# Patient Record
Sex: FEMALE | Race: WHITE | Employment: PART TIME | URBAN - METROPOLITAN AREA
[De-identification: names, ages, dates, MRNs, and addresses within clinical notes are randomized per-mention and may not be internally consistent; named-entity substitution may affect disease eponyms.]

---

## 2018-10-10 LAB
ABO/RH: NORMAL
ANTIBODY SCREEN: NEGATIVE
HCT VFR BLD CALC: 38.2 % (ref 36–46)
HEMOGLOBIN: 12.7 G/DL (ref 12–16)
HEPATITIS B SURFACE ANTIGEN INTERPRETATION: NEGATIVE
HEPATITIS C ANTIBODY: NEGATIVE
HIV-1 AND HIV-2 ANTIBODIES: NON REACTIVE
MCV RBC AUTO: 95 FL
RPR TITER: NON REACTIVE
RPR: NON REACTIVE
RUBELLA ANTIBODY IGG: NORMAL

## 2018-11-29 ENCOUNTER — ROUTINE PRENATAL (OUTPATIENT)
Dept: PERINATAL CARE | Age: 23
End: 2018-11-29
Payer: COMMERCIAL

## 2018-11-29 VITALS
SYSTOLIC BLOOD PRESSURE: 113 MMHG | HEIGHT: 62 IN | WEIGHT: 158.8 LBS | BODY MASS INDEX: 29.22 KG/M2 | HEART RATE: 104 BPM | DIASTOLIC BLOOD PRESSURE: 72 MMHG

## 2018-11-29 DIAGNOSIS — Z34.02 ENCOUNTER FOR SUPERVISION OF NORMAL FIRST PREGNANCY IN SECOND TRIMESTER: Primary | ICD-10-CM

## 2018-11-29 PROCEDURE — 76805 OB US >/= 14 WKS SNGL FETUS: CPT

## 2019-02-25 LAB — GROUP B STREP VAGINAL CULTURE: NEGATIVE

## 2019-03-23 ENCOUNTER — ANESTHESIA (OUTPATIENT)
Dept: LABOR AND DELIVERY | Age: 24
DRG: 540 | End: 2019-03-23
Payer: COMMERCIAL

## 2019-03-23 ENCOUNTER — ANESTHESIA EVENT (OUTPATIENT)
Dept: LABOR AND DELIVERY | Age: 24
DRG: 540 | End: 2019-03-23
Payer: COMMERCIAL

## 2019-03-23 ENCOUNTER — HOSPITAL ENCOUNTER (INPATIENT)
Age: 24
LOS: 3 days | Discharge: HOME | DRG: 540 | End: 2019-03-27
Attending: OBSTETRICS & GYNECOLOGY | Admitting: OBSTETRICS & GYNECOLOGY
Payer: COMMERCIAL

## 2019-03-23 DIAGNOSIS — G89.18 POST-OP PAIN: Primary | ICD-10-CM

## 2019-03-23 PROBLEM — Z3A.40 40 WEEKS GESTATION OF PREGNANCY: Status: ACTIVE | Noted: 2019-03-23

## 2019-03-23 LAB
AMPHETAMINE SCREEN, URINE: NORMAL
BARBITURATE SCREEN URINE: NORMAL
BENZODIAZEPINE SCREEN, URINE: NORMAL
BUPRENORPHINE URINE: NORMAL
CANNABINOID SCREEN URINE: NORMAL
COCAINE METABOLITE SCREEN URINE: NORMAL
HCT VFR BLD CALC: 34.6 % (ref 36–48)
HEMOGLOBIN: 11.5 G/DL (ref 12–16)
Lab: NORMAL
MCH RBC QN AUTO: 29.8 PG (ref 26–34)
MCHC RBC AUTO-ENTMCNC: 33.3 G/DL (ref 31–36)
MCV RBC AUTO: 89.5 FL (ref 80–100)
METHADONE SCREEN, URINE: NORMAL
OPIATE SCREEN URINE: NORMAL
OXYCODONE URINE: NORMAL
PDW BLD-RTO: 14.7 % (ref 12.4–15.4)
PH UA: 6
PHENCYCLIDINE SCREEN URINE: NORMAL
PLATELET # BLD: 368 K/UL (ref 135–450)
PMV BLD AUTO: 8.3 FL (ref 5–10.5)
PROPOXYPHENE SCREEN: NORMAL
RBC # BLD: 3.86 M/UL (ref 4–5.2)
SPECIMEN STATUS: NORMAL
TOTAL SYPHILLIS IGG/IGM: NORMAL
WBC # BLD: 11.3 K/UL (ref 4–11)

## 2019-03-23 PROCEDURE — 2580000003 HC RX 258: Performed by: OBSTETRICS & GYNECOLOGY

## 2019-03-23 PROCEDURE — 6360000002 HC RX W HCPCS: Performed by: OBSTETRICS & GYNECOLOGY

## 2019-03-23 PROCEDURE — 86850 RBC ANTIBODY SCREEN: CPT

## 2019-03-23 PROCEDURE — 86901 BLOOD TYPING SEROLOGIC RH(D): CPT

## 2019-03-23 PROCEDURE — 86900 BLOOD TYPING SEROLOGIC ABO: CPT

## 2019-03-23 PROCEDURE — 86780 TREPONEMA PALLIDUM: CPT

## 2019-03-23 PROCEDURE — 3E033VJ INTRODUCTION OF OTHER HORMONE INTO PERIPHERAL VEIN, PERCUTANEOUS APPROACH: ICD-10-PCS | Performed by: OBSTETRICS & GYNECOLOGY

## 2019-03-23 PROCEDURE — 85027 COMPLETE CBC AUTOMATED: CPT

## 2019-03-23 PROCEDURE — 6370000000 HC RX 637 (ALT 250 FOR IP): Performed by: OBSTETRICS & GYNECOLOGY

## 2019-03-23 PROCEDURE — 80307 DRUG TEST PRSMV CHEM ANLYZR: CPT

## 2019-03-23 PROCEDURE — 3700000025 EPIDURAL BLOCK: Performed by: ANESTHESIOLOGY

## 2019-03-23 PROCEDURE — 2500000003 HC RX 250 WO HCPCS: Performed by: NURSE ANESTHETIST, CERTIFIED REGISTERED

## 2019-03-23 RX ORDER — ONDANSETRON 2 MG/ML
4 INJECTION INTRAMUSCULAR; INTRAVENOUS EVERY 6 HOURS PRN
Status: DISCONTINUED | OUTPATIENT
Start: 2019-03-23 | End: 2019-03-24

## 2019-03-23 RX ORDER — BUPIVACAINE HYDROCHLORIDE 2.5 MG/ML
INJECTION, SOLUTION EPIDURAL; INFILTRATION; INTRACAUDAL PRN
Status: DISCONTINUED | OUTPATIENT
Start: 2019-03-23 | End: 2019-03-24 | Stop reason: SDUPTHER

## 2019-03-23 RX ORDER — DOCUSATE SODIUM 100 MG/1
100 CAPSULE, LIQUID FILLED ORAL 2 TIMES DAILY
Status: DISCONTINUED | OUTPATIENT
Start: 2019-03-23 | End: 2019-03-24

## 2019-03-23 RX ORDER — SODIUM CHLORIDE, SODIUM LACTATE, POTASSIUM CHLORIDE, CALCIUM CHLORIDE 600; 310; 30; 20 MG/100ML; MG/100ML; MG/100ML; MG/100ML
INJECTION, SOLUTION INTRAVENOUS CONTINUOUS
Status: DISCONTINUED | OUTPATIENT
Start: 2019-03-23 | End: 2019-03-24

## 2019-03-23 RX ORDER — LIDOCAINE HYDROCHLORIDE AND EPINEPHRINE 20; 5 MG/ML; UG/ML
INJECTION, SOLUTION EPIDURAL; INFILTRATION; INTRACAUDAL; PERINEURAL PRN
Status: DISCONTINUED | OUTPATIENT
Start: 2019-03-23 | End: 2019-03-24 | Stop reason: SDUPTHER

## 2019-03-23 RX ORDER — TERBUTALINE SULFATE 1 MG/ML
0.25 INJECTION, SOLUTION SUBCUTANEOUS ONCE
Status: DISCONTINUED | OUTPATIENT
Start: 2019-03-23 | End: 2019-03-24

## 2019-03-23 RX ADMIN — SODIUM CHLORIDE, POTASSIUM CHLORIDE, SODIUM LACTATE AND CALCIUM CHLORIDE: 600; 310; 30; 20 INJECTION, SOLUTION INTRAVENOUS at 16:23

## 2019-03-23 RX ADMIN — Medication 25 MCG: at 06:08

## 2019-03-23 RX ADMIN — Medication 25 MCG: at 01:59

## 2019-03-23 RX ADMIN — Medication 1 MILLI-UNITS/MIN: at 10:40

## 2019-03-23 RX ADMIN — SODIUM CHLORIDE, POTASSIUM CHLORIDE, SODIUM LACTATE AND CALCIUM CHLORIDE: 600; 310; 30; 20 INJECTION, SOLUTION INTRAVENOUS at 01:58

## 2019-03-23 RX ADMIN — SODIUM CHLORIDE, POTASSIUM CHLORIDE, SODIUM LACTATE AND CALCIUM CHLORIDE: 600; 310; 30; 20 INJECTION, SOLUTION INTRAVENOUS at 17:19

## 2019-03-23 RX ADMIN — ONDANSETRON 4 MG: 2 INJECTION INTRAMUSCULAR; INTRAVENOUS at 19:16

## 2019-03-23 RX ADMIN — BUPIVACAINE HYDROCHLORIDE 5 ML: 2.5 INJECTION, SOLUTION EPIDURAL; INFILTRATION; INTRACAUDAL; PERINEURAL at 17:30

## 2019-03-23 RX ADMIN — LIDOCAINE HYDROCHLORIDE AND EPINEPHRINE 3 ML: 20; 5 INJECTION, SOLUTION EPIDURAL; INFILTRATION; INTRACAUDAL; PERINEURAL at 17:27

## 2019-03-23 RX ADMIN — Medication 12 ML/HR: at 17:30

## 2019-03-23 RX ADMIN — SODIUM CHLORIDE, POTASSIUM CHLORIDE, SODIUM LACTATE AND CALCIUM CHLORIDE: 600; 310; 30; 20 INJECTION, SOLUTION INTRAVENOUS at 08:48

## 2019-03-23 NOTE — H&P
Obstetrics History and Physical        CHIEF COMPLAINT:  Induction of labor    HISTORY OF PRESENT ILLNESS:      The patient is a 26 y.o.  at 44w3d who has \had consistent prenatal care without complications.   Patient presents with a chief complaint as above and is being admitted for induction    DATES:    Last Menstrual Period:  18   Estimated Due Date:  Estimated Date of Delivery: 3/20/19     PRENATAL CARE:    Provider:  Елена Daniels MD     Blood Type/Rh:  A+  Antibody Screen:  neg  Rubella:  Equvicol  RPR:  neg  Hepatitis B Surface Antigen: neg  HIV:  neg  Gonorrhea:  neg  Chlamydia:  neg  MSAFP/Multiple Markers:  Date:  ; Results:  Declined testing  U/S Structural Survery:  Normal anatomy  1 hour Glucose Tolerance Test:  normal  Group B Strep:  neg  Trichomonas Screen:  neg  Cystic Fibrosis Screen:  Date:  ; Result:  Testing delcined  Hep C: neg    PAST OB HISTORY        Depression:  Yes, not currently on meds      Post-partum depression:  No      Diabetes:  No      Gestational Diabetes:  No      Thyroid Disease:  No      Chronic HTN:  No      Gestation HTN:  No      Pre-eclampsia:  No      Seizure disorder:  No      Asthma:  No      Clotting disorder:  No      :  No      Tubal ligation:  No      D & C:  No      Cerclage:  No      LEEP:  No      Myomectomy:  No    OB History    Para Term  AB Living   1             SAB TAB Ectopic Molar Multiple Live Births                    # Outcome Date GA Lbr Mj/2nd Weight Sex Delivery Anes PTL Lv   1 Current              Past Gynecological History:      Menarche:    Last menstrual period:  18  History of uterine fibroids:  No  History of endometriosis:  No  Pap History:  Patient has never had a Pap test.  Sexually transmitted disease history: none    Past Medical History:        Diagnosis Date    Depression     hospitalized in the past  no meds @ present    Depression with suicidal ideation 2016    [facility] EXAM:    General appearance:  awake, alert, cooperative, no apparent distress, and appears stated age  Neurologic:  Awake, alert, oriented to name, place and time. Cranial nerves II-XII are grossly intact. Heart:  regular rate and rhythm  Abdomen:  normal bowel sounds, soft, non-distended, non-tender; size appropriate for gestational age  Fetal heart rate:  Baseline Heart Rate 135, accelerations:  present  long term variability:  moderate  decelerations:  absent  Pelvis:    Cervix:  Exam by MD    DILATION:  4 cm  EFFACEMENT:   50%  STATION:  -2 cm  CONSISTENCY:  soft  POSITION:  mid  BISHOPS SCORE:      Contraction frequency:  1-3 minutes  Membranes:  AROM performed with moderate amount of clear fluid    ASSESSMENT AND PLAN:    .Van Rebolledo is a 26 y.o.  at 40w3d admitted for Induction of Labor since she is post dates.   -- s/p Cytotec x2 and currently on Pit of 5  --  Continue IOL with pitocin per protocol for adequate contractions, may insert IUPC as needed  -- epidural as desired  -- GBS negative  -- anticipate vaginal delivery    Clarita Lawrence MD

## 2019-03-23 NOTE — FLOWSHEET NOTE
Dr. Rich Barber called for and update on patient- notified her of last SVE with 2nd Cytotec placement, that patient has been sleeping through contractions, and of contraction pattern. Order received to place a 3rd Cytotec if patient is 1-2 cm with next vaginal exam, and to start Pitocin if patient is 3 cm or greater.

## 2019-03-24 VITALS
SYSTOLIC BLOOD PRESSURE: 121 MMHG | TEMPERATURE: 98.6 F | RESPIRATION RATE: 14 BRPM | DIASTOLIC BLOOD PRESSURE: 71 MMHG | OXYGEN SATURATION: 100 %

## 2019-03-24 PROBLEM — G89.18 POSTOPERATIVE PAIN: Status: ACTIVE | Noted: 2019-03-24

## 2019-03-24 LAB
ABO/RH: NORMAL
ANTIBODY SCREEN: NORMAL

## 2019-03-24 PROCEDURE — 6370000000 HC RX 637 (ALT 250 FOR IP): Performed by: OBSTETRICS & GYNECOLOGY

## 2019-03-24 PROCEDURE — 2500000003 HC RX 250 WO HCPCS: Performed by: NURSE ANESTHETIST, CERTIFIED REGISTERED

## 2019-03-24 PROCEDURE — 7100000001 HC PACU RECOVERY - ADDTL 15 MIN: Performed by: OBSTETRICS & GYNECOLOGY

## 2019-03-24 PROCEDURE — 2580000003 HC RX 258: Performed by: OBSTETRICS & GYNECOLOGY

## 2019-03-24 PROCEDURE — 6360000002 HC RX W HCPCS

## 2019-03-24 PROCEDURE — 6360000002 HC RX W HCPCS: Performed by: OBSTETRICS & GYNECOLOGY

## 2019-03-24 PROCEDURE — 51701 INSERT BLADDER CATHETER: CPT

## 2019-03-24 PROCEDURE — 7100000000 HC PACU RECOVERY - FIRST 15 MIN: Performed by: OBSTETRICS & GYNECOLOGY

## 2019-03-24 PROCEDURE — 3700000000 HC ANESTHESIA ATTENDED CARE: Performed by: OBSTETRICS & GYNECOLOGY

## 2019-03-24 PROCEDURE — 3609079900 HC CESAREAN SECTION: Performed by: OBSTETRICS & GYNECOLOGY

## 2019-03-24 PROCEDURE — 2500000003 HC RX 250 WO HCPCS

## 2019-03-24 PROCEDURE — 6370000000 HC RX 637 (ALT 250 FOR IP)

## 2019-03-24 PROCEDURE — 2709999900 HC NON-CHARGEABLE SUPPLY: Performed by: OBSTETRICS & GYNECOLOGY

## 2019-03-24 PROCEDURE — 3700000001 HC ADD 15 MINUTES (ANESTHESIA): Performed by: OBSTETRICS & GYNECOLOGY

## 2019-03-24 PROCEDURE — 6360000002 HC RX W HCPCS: Performed by: NURSE ANESTHETIST, CERTIFIED REGISTERED

## 2019-03-24 PROCEDURE — 1220000000 HC SEMI PRIVATE OB R&B

## 2019-03-24 RX ORDER — OXYTOCIN 10 [USP'U]/ML
INJECTION, SOLUTION INTRAMUSCULAR; INTRAVENOUS PRN
Status: DISCONTINUED | OUTPATIENT
Start: 2019-03-24 | End: 2019-03-24 | Stop reason: SDUPTHER

## 2019-03-24 RX ORDER — MORPHINE SULFATE 1 MG/ML
INJECTION, SOLUTION EPIDURAL; INTRATHECAL; INTRAVENOUS PRN
Status: DISCONTINUED | OUTPATIENT
Start: 2019-03-24 | End: 2019-03-24 | Stop reason: SDUPTHER

## 2019-03-24 RX ORDER — KETOROLAC TROMETHAMINE 30 MG/ML
30 INJECTION, SOLUTION INTRAMUSCULAR; INTRAVENOUS EVERY 8 HOURS
Status: DISCONTINUED | OUTPATIENT
Start: 2019-03-24 | End: 2019-03-27 | Stop reason: HOSPADM

## 2019-03-24 RX ORDER — SODIUM CHLORIDE 0.9 % (FLUSH) 0.9 %
10 SYRINGE (ML) INJECTION EVERY 12 HOURS SCHEDULED
Status: DISCONTINUED | OUTPATIENT
Start: 2019-03-24 | End: 2019-03-27 | Stop reason: HOSPADM

## 2019-03-24 RX ORDER — DOCUSATE SODIUM 100 MG/1
100 CAPSULE, LIQUID FILLED ORAL 2 TIMES DAILY
Status: DISCONTINUED | OUTPATIENT
Start: 2019-03-24 | End: 2019-03-27 | Stop reason: HOSPADM

## 2019-03-24 RX ORDER — ACETAMINOPHEN 160 MG/5ML
1000 SOLUTION ORAL EVERY 8 HOURS
Status: DISCONTINUED | OUTPATIENT
Start: 2019-03-24 | End: 2019-03-27 | Stop reason: HOSPADM

## 2019-03-24 RX ORDER — OXYCODONE HYDROCHLORIDE 5 MG/1
5 TABLET ORAL EVERY 4 HOURS PRN
Status: DISCONTINUED | OUTPATIENT
Start: 2019-03-25 | End: 2019-03-27 | Stop reason: HOSPADM

## 2019-03-24 RX ORDER — SODIUM CHLORIDE 0.9 % (FLUSH) 0.9 %
10 SYRINGE (ML) INJECTION PRN
Status: DISCONTINUED | OUTPATIENT
Start: 2019-03-24 | End: 2019-03-27 | Stop reason: HOSPADM

## 2019-03-24 RX ORDER — ONDANSETRON 2 MG/ML
INJECTION INTRAMUSCULAR; INTRAVENOUS PRN
Status: DISCONTINUED | OUTPATIENT
Start: 2019-03-24 | End: 2019-03-24 | Stop reason: SDUPTHER

## 2019-03-24 RX ORDER — OXYCODONE HYDROCHLORIDE 5 MG/1
10 TABLET ORAL EVERY 4 HOURS PRN
Status: DISCONTINUED | OUTPATIENT
Start: 2019-03-25 | End: 2019-03-27 | Stop reason: HOSPADM

## 2019-03-24 RX ORDER — ACETAMINOPHEN 500 MG
1000 TABLET ORAL EVERY 8 HOURS SCHEDULED
Status: DISCONTINUED | OUTPATIENT
Start: 2019-03-24 | End: 2019-03-24

## 2019-03-24 RX ORDER — CEFAZOLIN SODIUM 2 G/100ML
2 INJECTION, SOLUTION INTRAVENOUS ONCE
Status: DISCONTINUED | OUTPATIENT
Start: 2019-03-24 | End: 2019-03-24

## 2019-03-24 RX ORDER — ACETAMINOPHEN 325 MG/1
650 TABLET ORAL EVERY 4 HOURS PRN
Status: DISCONTINUED | OUTPATIENT
Start: 2019-03-24 | End: 2019-03-27 | Stop reason: HOSPADM

## 2019-03-24 RX ORDER — KETOROLAC TROMETHAMINE 30 MG/ML
INJECTION, SOLUTION INTRAMUSCULAR; INTRAVENOUS PRN
Status: DISCONTINUED | OUTPATIENT
Start: 2019-03-24 | End: 2019-03-24 | Stop reason: SDUPTHER

## 2019-03-24 RX ORDER — SODIUM CHLORIDE, SODIUM LACTATE, POTASSIUM CHLORIDE, CALCIUM CHLORIDE 600; 310; 30; 20 MG/100ML; MG/100ML; MG/100ML; MG/100ML
INJECTION, SOLUTION INTRAVENOUS CONTINUOUS
Status: DISCONTINUED | OUTPATIENT
Start: 2019-03-24 | End: 2019-03-27 | Stop reason: HOSPADM

## 2019-03-24 RX ORDER — TRISODIUM CITRATE DIHYDRATE AND CITRIC ACID MONOHYDRATE 500; 334 MG/5ML; MG/5ML
30 SOLUTION ORAL ONCE
Status: COMPLETED | OUTPATIENT
Start: 2019-03-24 | End: 2019-03-24

## 2019-03-24 RX ORDER — DEXAMETHASONE SODIUM PHOSPHATE 4 MG/ML
INJECTION, SOLUTION INTRA-ARTICULAR; INTRALESIONAL; INTRAMUSCULAR; INTRAVENOUS; SOFT TISSUE PRN
Status: DISCONTINUED | OUTPATIENT
Start: 2019-03-24 | End: 2019-03-24 | Stop reason: SDUPTHER

## 2019-03-24 RX ORDER — TRISODIUM CITRATE DIHYDRATE AND CITRIC ACID MONOHYDRATE 500; 334 MG/5ML; MG/5ML
SOLUTION ORAL
Status: COMPLETED
Start: 2019-03-24 | End: 2019-03-24

## 2019-03-24 RX ORDER — METHYLERGONOVINE MALEATE 0.2 MG/ML
200 INJECTION INTRAVENOUS PRN
Status: DISCONTINUED | OUTPATIENT
Start: 2019-03-24 | End: 2019-03-27 | Stop reason: HOSPADM

## 2019-03-24 RX ORDER — CEFAZOLIN SODIUM 2 G/100ML
2 INJECTION, SOLUTION INTRAVENOUS ONCE
Status: DISCONTINUED | OUTPATIENT
Start: 2019-03-24 | End: 2019-03-24 | Stop reason: SDUPTHER

## 2019-03-24 RX ORDER — METOCLOPRAMIDE HYDROCHLORIDE 5 MG/ML
INJECTION INTRAMUSCULAR; INTRAVENOUS
Status: COMPLETED
Start: 2019-03-24 | End: 2019-03-24

## 2019-03-24 RX ORDER — IBUPROFEN 800 MG/1
800 TABLET ORAL EVERY 8 HOURS SCHEDULED
Status: DISCONTINUED | OUTPATIENT
Start: 2019-03-24 | End: 2019-03-24

## 2019-03-24 RX ORDER — ACETAMINOPHEN 500 MG
1000 TABLET ORAL ONCE
Status: COMPLETED | OUTPATIENT
Start: 2019-03-24 | End: 2019-03-24

## 2019-03-24 RX ORDER — LANOLIN 100 %
OINTMENT (GRAM) TOPICAL
Status: DISCONTINUED | OUTPATIENT
Start: 2019-03-24 | End: 2019-03-27 | Stop reason: HOSPADM

## 2019-03-24 RX ORDER — METOCLOPRAMIDE HYDROCHLORIDE 5 MG/ML
10 INJECTION INTRAMUSCULAR; INTRAVENOUS ONCE
Status: COMPLETED | OUTPATIENT
Start: 2019-03-24 | End: 2019-03-24

## 2019-03-24 RX ADMIN — SODIUM CHLORIDE, POTASSIUM CHLORIDE, SODIUM LACTATE AND CALCIUM CHLORIDE: 600; 310; 30; 20 INJECTION, SOLUTION INTRAVENOUS at 08:51

## 2019-03-24 RX ADMIN — DEXAMETHASONE SODIUM PHOSPHATE 4 MG: 4 INJECTION, SOLUTION INTRAMUSCULAR; INTRAVENOUS at 14:35

## 2019-03-24 RX ADMIN — ACETAMINOPHEN 1000 MG: 500 TABLET, FILM COATED ORAL at 09:07

## 2019-03-24 RX ADMIN — SODIUM CITRATE AND CITRIC ACID MONOHYDRATE 30 ML: 500; 334 SOLUTION ORAL at 11:52

## 2019-03-24 RX ADMIN — METOCLOPRAMIDE 10 MG: 5 INJECTION, SOLUTION INTRAMUSCULAR; INTRAVENOUS at 11:52

## 2019-03-24 RX ADMIN — KETOROLAC TROMETHAMINE 30 MG: 30 INJECTION, SOLUTION INTRAMUSCULAR at 23:30

## 2019-03-24 RX ADMIN — TRISODIUM CITRATE DIHYDRATE AND CITRIC ACID MONOHYDRATE 30 ML: 500; 334 SOLUTION ORAL at 11:52

## 2019-03-24 RX ADMIN — PHENYLEPHRINE HYDROCHLORIDE 200 MCG: 10 INJECTION INTRAVENOUS at 15:07

## 2019-03-24 RX ADMIN — LIDOCAINE HYDROCHLORIDE AND EPINEPHRINE 10 ML: 20; 5 INJECTION, SOLUTION EPIDURAL; INFILTRATION; INTRACAUDAL; PERINEURAL at 14:28

## 2019-03-24 RX ADMIN — ONDANSETRON 4 MG: 2 INJECTION INTRAMUSCULAR; INTRAVENOUS at 14:35

## 2019-03-24 RX ADMIN — SODIUM CHLORIDE, POTASSIUM CHLORIDE, SODIUM LACTATE AND CALCIUM CHLORIDE: 600; 310; 30; 20 INJECTION, SOLUTION INTRAVENOUS at 04:59

## 2019-03-24 RX ADMIN — ACETAMINOPHEN: 650 SOLUTION ORAL at 22:02

## 2019-03-24 RX ADMIN — SODIUM CHLORIDE, POTASSIUM CHLORIDE, SODIUM LACTATE AND CALCIUM CHLORIDE: 600; 310; 30; 20 INJECTION, SOLUTION INTRAVENOUS at 14:27

## 2019-03-24 RX ADMIN — METOCLOPRAMIDE HYDROCHLORIDE 10 MG: 5 INJECTION INTRAMUSCULAR; INTRAVENOUS at 11:52

## 2019-03-24 RX ADMIN — KETOROLAC TROMETHAMINE 30 MG: 30 INJECTION, SOLUTION INTRAMUSCULAR; INTRAVENOUS at 15:05

## 2019-03-24 RX ADMIN — SODIUM CHLORIDE, POTASSIUM CHLORIDE, SODIUM LACTATE AND CALCIUM CHLORIDE: 600; 310; 30; 20 INJECTION, SOLUTION INTRAVENOUS at 00:42

## 2019-03-24 RX ADMIN — SODIUM CHLORIDE, POTASSIUM CHLORIDE, SODIUM LACTATE AND CALCIUM CHLORIDE: 600; 310; 30; 20 INJECTION, SOLUTION INTRAVENOUS at 16:57

## 2019-03-24 RX ADMIN — LIDOCAINE HYDROCHLORIDE AND EPINEPHRINE 10 ML: 20; 5 INJECTION, SOLUTION EPIDURAL; INFILTRATION; INTRACAUDAL; PERINEURAL at 14:32

## 2019-03-24 RX ADMIN — DOCUSATE SODIUM 100 MG: 100 CAPSULE, LIQUID FILLED ORAL at 22:03

## 2019-03-24 RX ADMIN — MORPHINE SULFATE 2 MG: 1 INJECTION EPIDURAL; INTRATHECAL; INTRAVENOUS at 14:30

## 2019-03-24 RX ADMIN — PHENYLEPHRINE HYDROCHLORIDE 200 MCG: 10 INJECTION INTRAVENOUS at 15:03

## 2019-03-24 RX ADMIN — Medication 1 MILLI-UNITS/MIN: at 01:45

## 2019-03-24 RX ADMIN — OXYTOCIN 20 UNITS: 10 INJECTION INTRAVENOUS at 14:53

## 2019-03-24 RX ADMIN — FAMOTIDINE 20 MG: 10 INJECTION, SOLUTION INTRAVENOUS at 11:52

## 2019-03-24 ASSESSMENT — PULMONARY FUNCTION TESTS
PIF_VALUE: 0
PIF_VALUE: 1
PIF_VALUE: 0

## 2019-03-24 ASSESSMENT — PAIN SCALES - GENERAL
PAINLEVEL_OUTOF10: 0
PAINLEVEL_OUTOF10: 2
PAINLEVEL_OUTOF10: 2

## 2019-03-24 NOTE — PROGRESS NOTES
Labor Progress Note      SUBJECTIVE:  Patient comfortable with epidural and pushing well but baby remains in direct OP presentation. OBJECTIVE:      Vitals:    /60   Pulse 90   Temp 99 °F (37.2 °C) (Oral)   Resp 18   Ht 5' 2\" (1.575 m)   Wt 186 lb (84.4 kg)   SpO2 97%   BMI 34.02 kg/m²       Fetal heart rate:       Baseline Heart Rate:  150-160        Accelerations:  none       Long Term Variability:  moderate       Decelerations:  Variables and early decels noted with pushing         Contraction frequency: 4-5 minutes    Fetal Presentation:  Cephalic    Fetal Position:  Cephalic    Membranes:  Ruptured clear fluid    Cervix:           Dilation:  Complete         Effacement:  100%         Station:  +2         Consistency:           Position:        ASSESSMENT & PLAN:    Carisa Bahena is a 26 y.o.  at 36w2d who is complete and pushing    -- anticipate vaginal delivery  -- baby in OP position - discussed options of laboring down with position changes, continued pushing with position changes,  section with patient and family. Patient would like to avoid a  section if possible. Will labor down with a variety of positions to aid baby with rotation and then attempt pushing again.     Damari Shaffer MD

## 2019-03-24 NOTE — FLOWSHEET NOTE
Dr. Wells Lamp at bedside, reviewed efm strip, and performed SVE. Orders received to let patient labor down.

## 2019-03-24 NOTE — PROGRESS NOTES
Labor Progress Note      SUBJECTIVE:  Patient comfortable with epidural and pushing well but baby remains in direct OP presentation and +2 station. OBJECTIVE:      Vitals:    /74   Pulse 75   Temp 98.3 °F (36.8 °C) (Oral)   Resp 16   Ht 5' 2\" (1.575 m)   Wt 186 lb (84.4 kg)   SpO2 96%   BMI 34.02 kg/m²       Fetal heart rate:       Baseline Heart Rate:  150-160        Accelerations:  none       Long Term Variability:  moderate       Decelerations:  Variables and early decels noted with pushing         Contraction frequency: 4-5 minutes    Fetal Presentation:  Cephalic    Fetal Position:  Cephalic    Membranes:  Ruptured clear fluid    Cervix:           Dilation:  Complete         Effacement:  100%         Station:  +2         Consistency:           Position:        ASSESSMENT & PLAN:    Paige España is a 26 y.o.  at 36w2d who is complete and pushing    -- baby in still OP position and at +2 station despite 4 hours of pushing in various positions and with good effort; recommendation to proceed with LTCS  -- reviewed procedure, R/B/A/I risks including but not limited to infection, bleeding, damage to surrounding structures, injury to baby, remote risk of hysterectomy and death. All questions answered and patient agrees with plan to proceed. -- staff notified of decision for primary LTCS due to arrest of descent.       Rock Wills MD

## 2019-03-24 NOTE — OP NOTE
Section Note    Indications: arrest of descent    Pre-operative Diagnosis: IUP at 40w4d, arrest of descent    Post-operative Diagnosis: same and Living  infant(s) and Male    Surgeon: Pam Dawn     Assistants: Surgical Assistant: Marquis Robertson    Anesthesia: Epidural anesthesia      Procedure Details   The patient was seen in the Holding Room. The risks, benefits, complications, treatment options, and expected outcomes were discussed with the patient. The patient concurred with the proposed plan, giving informed consent. The site of surgery properly noted/marked. The patient was taken to Operating Room # 2, identified as Buzz Garcia and the procedure verified as  Delivery. A Time Out was held and the above information confirmed. After induction of anesthesia, the patient was draped and prepped in the usual sterile manner. A Pfannenstiel incision was made and carried down through the subcutaneous tissue to the fascia. Fascial incision was made and extended transversely. The fascia was  from the underlying rectus tissue superiorly and inferiorly. The peritoneum was identified and entered. Peritoneal incision was extended longitudinally. The utero-vesical peritoneal reflection was incised transversely and the bladder flap was bluntly freed from the lower uterine segment. A low transverse uterine incision was made. Delivered from OT presentation was a 3800 gram (8 lbs, 6 oz) male infant with Apgar scores of 4 at one minute and 6 at five minutes and 8 at 10 minutes. After the umbilical cord was clamped and cut cord blood was obtained for evaluation. The placenta was removed intact and appeared normal. The uterine outline, tubes and ovaries appeared normal. The uterine incision was closed with running locked sutures of 0 Polysorb in a 2 layer fashion. Serosal layer was re approximated with 3-0 vicryl in a running fashion. Hemostasis was observed.   Uterus was returned to the abdomen and lavage was carried out until clear. The peritoneum was re-approximated with 3-0 vicryl in a running fashion. The fascia was then reapproximated with running sutures of 0 Polysorb in a running fashion. The subcutaneous space was closed with 3-0 vicryl in an interrupted fashion and the skin was reapproximated with 4-0 vicryl in a subcuticular fashion. Instrument, sponge, and needle counts were correct prior the abdominal closure and at the conclusion of the case. Intake/Output:     Date 03/23/19 1501 - 03/24/19 0700 03/24/19 0701 - 03/25/19 0700   Shift 5985-2162 1784-6288 24 Hour Total 1329-7972 4444-5435 7343-0681 24 Hour Total   INTAKE   I.V. 1900 1900 4750 1000   1000   Shift Total 1900 1900 4750 1000   1000   OUTPUT   Urine 250 1650 2200 200 100  300   Blood     700  700     EBL (mL)     700  700   Shift Total 250 1650 2200 200 800  1000   NET 5671 468 1159 800 -800  0            Drains: salazar to gravity                Specimens: cord blood and cord gases           Implants: none           Complications:  prolonged second stage         Disposition: PACU - hemodynamically stable.            Condition: infant stable to general nursery and mother stable

## 2019-03-24 NOTE — PROGRESS NOTES
Labor Progress Note      SUBJECTIVE:  Patient comfortable with epidural.    OBJECTIVE:      Vitals:    /75   Pulse 84   Temp 98.6 °F (37 °C) (Oral)   Resp 20   Ht 5' 2\" (1.575 m)   Wt 186 lb (84.4 kg)   SpO2 96%   BMI 34.02 kg/m²       Fetal heart rate:       Baseline Heart Rate:  125        Accelerations:  intermittent       Long Term Variability:  moderate       Decelerations:  Variables noted prior to pushing and early decels noted with pushing         Contraction frequency: 4-5 minutes    Fetal Presentation:  Cephalic    Fetal Position:  Cephalic    Membranes:  Ruptured clear fluid    Cervix:           Dilation:  Complete         Effacement:  100%         Station:  +2         Consistency:           Position:        ASSESSMENT & PLAN:    Poonam Rodriguez is a 26 y.o.  at 36w2d who is complete and pushing    -- anticipate vaginal delivery    . Janell Hwang MD

## 2019-03-25 LAB
HCT VFR BLD CALC: 28.3 % (ref 36–48)
HEMOGLOBIN: 9.4 G/DL (ref 12–16)
MCH RBC QN AUTO: 30 PG (ref 26–34)
MCHC RBC AUTO-ENTMCNC: 33.2 G/DL (ref 31–36)
MCV RBC AUTO: 90.2 FL (ref 80–100)
PDW BLD-RTO: 15.1 % (ref 12.4–15.4)
PLATELET # BLD: 291 K/UL (ref 135–450)
PMV BLD AUTO: 8.2 FL (ref 5–10.5)
RBC # BLD: 3.14 M/UL (ref 4–5.2)
WBC # BLD: 19.3 K/UL (ref 4–11)

## 2019-03-25 PROCEDURE — 85027 COMPLETE CBC AUTOMATED: CPT

## 2019-03-25 PROCEDURE — 1220000000 HC SEMI PRIVATE OB R&B

## 2019-03-25 PROCEDURE — 6360000002 HC RX W HCPCS: Performed by: OBSTETRICS & GYNECOLOGY

## 2019-03-25 PROCEDURE — 2580000003 HC RX 258: Performed by: OBSTETRICS & GYNECOLOGY

## 2019-03-25 PROCEDURE — 6370000000 HC RX 637 (ALT 250 FOR IP): Performed by: OBSTETRICS & GYNECOLOGY

## 2019-03-25 RX ADMIN — Medication 10 ML: at 08:14

## 2019-03-25 RX ADMIN — IBUPROFEN 800 MG: 100 SUSPENSION ORAL at 16:26

## 2019-03-25 RX ADMIN — ACETAMINOPHEN 1000 MG: 650 SOLUTION ORAL at 14:08

## 2019-03-25 RX ADMIN — SODIUM CHLORIDE, POTASSIUM CHLORIDE, SODIUM LACTATE AND CALCIUM CHLORIDE: 600; 310; 30; 20 INJECTION, SOLUTION INTRAVENOUS at 04:13

## 2019-03-25 RX ADMIN — KETOROLAC TROMETHAMINE 30 MG: 30 INJECTION, SOLUTION INTRAMUSCULAR at 08:14

## 2019-03-25 RX ADMIN — ACETAMINOPHEN 1000 MG: 650 SOLUTION ORAL at 06:24

## 2019-03-25 RX ADMIN — DOCUSATE SODIUM 100 MG: 100 CAPSULE, LIQUID FILLED ORAL at 09:00

## 2019-03-25 RX ADMIN — Medication 10 ML: at 21:24

## 2019-03-25 RX ADMIN — ACETAMINOPHEN 1000 MG: 650 SOLUTION ORAL at 21:22

## 2019-03-25 RX ADMIN — DOCUSATE SODIUM 100 MG: 100 CAPSULE, LIQUID FILLED ORAL at 21:22

## 2019-03-25 ASSESSMENT — PAIN SCALES - GENERAL
PAINLEVEL_OUTOF10: 2
PAINLEVEL_OUTOF10: 2
PAINLEVEL_OUTOF10: 0
PAINLEVEL_OUTOF10: 4
PAINLEVEL_OUTOF10: 1

## 2019-03-25 NOTE — FLOWSHEET NOTE
Dr Breanne Avila called in to check on [id number] H & H result-will see pt later today-to leave dressing on till dr arrives.

## 2019-03-26 PROCEDURE — 6370000000 HC RX 637 (ALT 250 FOR IP): Performed by: OBSTETRICS & GYNECOLOGY

## 2019-03-26 PROCEDURE — 1220000000 HC SEMI PRIVATE OB R&B

## 2019-03-26 RX ORDER — FERROUS SULFATE 325(65) MG
325 TABLET ORAL 2 TIMES DAILY WITH MEALS
Status: DISCONTINUED | OUTPATIENT
Start: 2019-03-26 | End: 2019-03-27 | Stop reason: HOSPADM

## 2019-03-26 RX ADMIN — IBUPROFEN 800 MG: 100 SUSPENSION ORAL at 12:23

## 2019-03-26 RX ADMIN — DOCUSATE SODIUM 100 MG: 100 CAPSULE, LIQUID FILLED ORAL at 20:06

## 2019-03-26 RX ADMIN — ACETAMINOPHEN 1000 MG: 650 SOLUTION ORAL at 17:26

## 2019-03-26 RX ADMIN — ACETAMINOPHEN 1000 MG: 650 SOLUTION ORAL at 06:31

## 2019-03-26 RX ADMIN — FERROUS SULFATE TAB 325 MG (65 MG ELEMENTAL FE) 325 MG: 325 (65 FE) TAB at 19:16

## 2019-03-26 RX ADMIN — IBUPROFEN 800 MG: 100 SUSPENSION ORAL at 20:06

## 2019-03-26 RX ADMIN — DOCUSATE SODIUM 100 MG: 100 CAPSULE, LIQUID FILLED ORAL at 12:25

## 2019-03-26 RX ADMIN — IBUPROFEN 800 MG: 100 SUSPENSION ORAL at 00:23

## 2019-03-26 ASSESSMENT — PAIN SCALES - GENERAL
PAINLEVEL_OUTOF10: 2
PAINLEVEL_OUTOF10: 2
PAINLEVEL_OUTOF10: 3
PAINLEVEL_OUTOF10: 4
PAINLEVEL_OUTOF10: 2

## 2019-03-26 NOTE — PROGRESS NOTES
post-partum note    Post-partum Day #2    Subjective:    Mild Incisional Pain   Lochia: Normal    Objective:  Vitals:    03/25/19 2215 03/26/19 0615 03/26/19 1230 03/26/19 1730   BP: 113/71 123/75 111/76 120/87   Pulse: 104 111 108 109   Resp: 16 16 18 18   Temp: 97.9 °F (36.6 °C) 99.3 °F (37.4 °C) 97.8 °F (36.6 °C) 97.4 °F (36.3 °C)   TempSrc: Oral Oral Oral Oral   SpO2: 98% 98%     Weight:       Height:           Physical Examination:  Appears well  Uterus: Firm, NT  Calves: NT  Incision is clean and dry  Labs:    Recent Labs     03/25/19  0636   WBC 19.3*   HGB 9.4*   HCT 28.3*        No results for input(s): NA, K, CL, CO2, BUN, CREATININE, CALCIUM, AST, ALT in the last 72 hours.     Invalid input(s): LESLEY      Current Facility-Administered Medications:     lactated ringers infusion, , Intravenous, Continuous, Clariat Lawrence MD, Stopped at 03/25/19 1018    sodium chloride flush 0.9 % injection 10 mL, 10 mL, Intravenous, 2 times per day, Clarita Lawrence MD, 10 mL at 03/25/19 2124    sodium chloride flush 0.9 % injection 10 mL, 10 mL, Intravenous, PRN, Clarita Lawrence MD    ketorolac (TORADOL) injection 30 mg, 30 mg, Intravenous, Q8H, Clarita Lawrence MD, 30 mg at 03/25/19 9612    oxyCODONE (ROXICODONE) immediate release tablet 5 mg, 5 mg, Oral, Q4H PRN **OR** oxyCODONE (ROXICODONE) immediate release tablet 10 mg, 10 mg, Oral, Q4H PRN, Clarita Lawrence MD    acetaminophen (TYLENOL) tablet 650 mg, 650 mg, Oral, Q4H PRN, Clarita Lawrence MD    docusate sodium (COLACE) capsule 100 mg, 100 mg, Oral, BID, Clarita Lawrence MD, 100 mg at 03/26/19 1225    lanolin ointment, , Topical, Q1H PRN, Clarita Lawrence MD    methylergonovine (METHERGINE) injection 200 mcg, 200 mcg, Intramuscular, PRN, Clarita Lawrence MD    acetaminophen (TYLENOL) 160 MG/5ML solution 1,000 mg, 1,000 mg, Oral, Q8H, Clarita Lawrence MD, 1,000 mg at 03/26/19 1726    ibuprofen (ADVIL;MOTRIN) 100 MG/5ML suspension

## 2019-03-26 NOTE — FLOWSHEET NOTE
RN in room, pt sleeping, pt woke for a moment and stated she is resting. Instructed pt to call RN if needing anything. RN will continue to monitor.

## 2019-03-26 NOTE — PLAN OF CARE
Order and notes faxed to [facility]   Problem: VAGINAL DELIVERY - RECOVERY AND POST PARTUM  Goal: Vital signs are medically acceptable  Outcome: Ongoing  Goal: Fundus firm at midline  Outcome: Ongoing  Goal: Moderate rubra without clots, no purulent discharge, no foul smelling lochia  Outcome: Ongoing  Goal: Verbalizes understanding of normal bowel function resumption  Outcome: Ongoing  Goal: Edema will be absent or minimal  Outcome: Ongoing  Goal: Breasts are soft with nipple integrity intact  Outcome: Ongoing  Goal: Demonstrates appropriate breast feeding techniques  Outcome: Ongoing  Goal: Positive Mother-Baby interactions are observed  Outcome: Ongoing  Goal: Perineum intact without discharge or hematoma  Outcome: Ongoing     Problem: PAIN  Goal: Patient's pain/discomfort is manageable  Outcome: Ongoing     Problem: Venous Thromboembolism:  Goal: Will show no signs or symptoms of venous thromboembolism  Description  Will show no signs or symptoms of venous thromboembolism  Outcome: Ongoing

## 2019-03-27 VITALS
RESPIRATION RATE: 18 BRPM | OXYGEN SATURATION: 98 % | SYSTOLIC BLOOD PRESSURE: 121 MMHG | TEMPERATURE: 98.1 F | WEIGHT: 186 LBS | HEIGHT: 62 IN | DIASTOLIC BLOOD PRESSURE: 84 MMHG | HEART RATE: 106 BPM | BODY MASS INDEX: 34.23 KG/M2

## 2019-03-27 PROCEDURE — 6370000000 HC RX 637 (ALT 250 FOR IP): Performed by: OBSTETRICS & GYNECOLOGY

## 2019-03-27 RX ORDER — OXYCODONE HYDROCHLORIDE 5 MG/1
5 TABLET ORAL EVERY 4 HOURS PRN
Qty: 28 TABLET | Refills: 0 | Status: SHIPPED | OUTPATIENT
Start: 2019-03-27 | End: 2019-03-30

## 2019-03-27 RX ADMIN — FERROUS SULFATE TAB 325 MG (65 MG ELEMENTAL FE) 325 MG: 325 (65 FE) TAB at 09:38

## 2019-03-27 RX ADMIN — ACETAMINOPHEN 1000 MG: 650 SOLUTION ORAL at 09:45

## 2019-03-27 RX ADMIN — IBUPROFEN 800 MG: 100 SUSPENSION ORAL at 13:14

## 2019-03-27 RX ADMIN — IBUPROFEN 800 MG: 100 SUSPENSION ORAL at 03:57

## 2019-03-27 RX ADMIN — ACETAMINOPHEN 1000 MG: 650 SOLUTION ORAL at 01:27

## 2019-03-27 RX ADMIN — DOCUSATE SODIUM 100 MG: 100 CAPSULE, LIQUID FILLED ORAL at 09:38

## 2019-03-27 ASSESSMENT — PAIN SCALES - GENERAL
PAINLEVEL_OUTOF10: 3

## 2019-03-27 NOTE — PLAN OF CARE
Problem: Pain:  Goal: Pain level will decrease  Description  Pain level will decrease  Outcome: Ongoing  Goal: Control of acute pain  Description  Control of acute pain  Outcome: Ongoing  Goal: Control of chronic pain  Description  Control of chronic pain  Outcome: Ongoing     Problem: Discharge Planning:  Goal: Discharged to appropriate level of care  Description  Discharged to appropriate level of care  Outcome: Ongoing     Problem: Fluid Volume - Imbalance:  Goal: Absence of postpartum hemorrhage signs and symptoms  Description  Absence of postpartum hemorrhage signs and symptoms  Outcome: Ongoing  Goal: Absence of imbalanced fluid volume signs and symptoms  Description  Absence of imbalanced fluid volume signs and symptoms  Outcome: Ongoing     Problem: Infection - Surgical Site:  Goal: Will show no infection signs and symptoms  Description  Will show no infection signs and symptoms  Outcome: Ongoing     Problem: Mood - Altered:  Goal: Mood stable  Description  Mood stable  Outcome: Ongoing     Problem: Pain - Acute:  Goal: Pain level will decrease  Description  Pain level will decrease  Outcome: Ongoing     Problem: Venous Thromboembolism:  Goal: Will show no signs or symptoms of venous thromboembolism  Description  Will show no signs or symptoms of venous thromboembolism  Outcome: Ongoing  Goal: Absence of signs or symptoms of impaired coagulation  Description  Absence of signs or symptoms of impaired coagulation  Outcome: Ongoing

## 2019-03-27 NOTE — FLOWSHEET NOTE
Postpartum and infant care teaching completed and forms signed by patient. Copy witnessed by RN and given to patient. Patient verbalized understanding of all teaching points. Prescriptions given. Patient plans to follow-up with OB Provider in 1 week. Patient verbalizes understanding of discharge instructions and denies further questions. ID bands checked. Mother's ID band and one of baby's ID bands removed and taped to footprint sheet, signed by patient and witnessed by RN. Patient discharged in stable condition accompanied by family. Discharged in wheelchair, holding baby in arms.

## 2019-03-28 NOTE — DISCHARGE SUMMARY
[address]                     [address] [facility], [address]                               DISCHARGE SUMMARY    PATIENT NAME: Irma Carballo                    :        1995  MED REC NO:   [id number]                          ROOM:         ACCOUNT NO:   [de-identified]                           ADMIT DATE: 2019  PROVIDER:     Mikayla Ricci MD                  DISCHARGE DATE:  2019    FINAL DIAGNOSES:  Pregnancy, intrauterine, term delivered, primary low  transverse  section for CPD. PRESENT ILLNESS:  The patient is a 44-year-old white female,  1,  para 0, who was at the time of delivery, 40 weeks and 4 days gestation. The patient labored to complete dilatation but after several hours of  pushing, was unable to deliver the baby. Therefore, a primary low  transverse  section was done, delivering a male infant, weight 8  pounds 6 ounces and Apgars 4, 6 and 8. Since delivery, the mother has  done well, but the baby has been in the special nursery for 48 hours  because of the initial low Apgars scores. The patient is discharged on  the 3rd postpartum day in satisfactory condition to be followed in our  office in four days. Instructions have been given. DISCHARGE MEDICATIONS:  Include:  1. Percocet. 2.  Motrin.         Adriana Burks MD    D: 2019 8:17:49       T: 2019 8:43:11     [email or web address]  Job#: [id number]     Doc#: [id number]    CC:

## 2020-08-24 LAB
ABO/RH: NORMAL
ANTIBODY SCREEN: NORMAL

## 2020-08-25 LAB — HEPATITIS C ANTIBODY INTERPRETATION: NORMAL

## 2020-08-26 LAB
ORGANISM: ABNORMAL
URINE CULTURE, ROUTINE: ABNORMAL

## 2020-08-27 LAB
BASOPHILS ABSOLUTE: 0 K/UL (ref 0–0.2)
BASOPHILS RELATIVE PERCENT: 0.4 %
EOSINOPHILS ABSOLUTE: 0 K/UL (ref 0–0.6)
EOSINOPHILS RELATIVE PERCENT: 0.5 %
HCT VFR BLD CALC: 36.3 % (ref 36–48)
HEMOGLOBIN: 12.3 G/DL (ref 12–16)
HEPATITIS B SURFACE ANTIGEN INTERPRETATION: ABNORMAL
HIV AG/AB: NORMAL
HIV ANTIGEN: NORMAL
HIV-1 ANTIBODY: NORMAL
HIV-2 AB: NORMAL
LYMPHOCYTES ABSOLUTE: 1.1 K/UL (ref 1–5.1)
LYMPHOCYTES RELATIVE PERCENT: 13.6 %
MCH RBC QN AUTO: 32.7 PG (ref 26–34)
MCHC RBC AUTO-ENTMCNC: 34 G/DL (ref 31–36)
MCV RBC AUTO: 96.3 FL (ref 80–100)
MONOCYTES ABSOLUTE: 0.5 K/UL (ref 0–1.3)
MONOCYTES RELATIVE PERCENT: 6.2 %
NEUTROPHILS ABSOLUTE: 6.6 K/UL (ref 1.7–7.7)
NEUTROPHILS RELATIVE PERCENT: 79.3 %
PDW BLD-RTO: 14 % (ref 12.4–15.4)
PLATELET # BLD: 237 K/UL (ref 135–450)
PMV BLD AUTO: 8.4 FL (ref 5–10.5)
RBC # BLD: 3.77 M/UL (ref 4–5.2)
RUBELLA ANTIBODY IGG: 11.9 IU/ML
TOTAL SYPHILLIS IGG/IGM: ABNORMAL
WBC # BLD: 8.4 K/UL (ref 4–11)

## 2020-09-22 LAB — GLUCOSE CHALLENGE: 121 MG/DL

## 2020-10-15 LAB
ORGANISM: ABNORMAL
URINE CULTURE, ROUTINE: ABNORMAL

## 2020-11-12 LAB — URINE CULTURE, ROUTINE: NORMAL

## 2020-12-08 ENCOUNTER — OFFICE VISIT (OUTPATIENT)
Dept: PRIMARY CARE CLINIC | Age: 25
End: 2020-12-08
Payer: COMMERCIAL

## 2020-12-08 LAB — GBS, EXTERNAL RESULT: POSITIVE

## 2020-12-08 PROCEDURE — G8421 BMI NOT CALCULATED: HCPCS | Performed by: NURSE PRACTITIONER

## 2020-12-08 PROCEDURE — G8428 CUR MEDS NOT DOCUMENT: HCPCS | Performed by: NURSE PRACTITIONER

## 2020-12-08 PROCEDURE — 99211 OFF/OP EST MAY X REQ PHY/QHP: CPT | Performed by: NURSE PRACTITIONER

## 2020-12-08 NOTE — PROGRESS NOTES
Karlie Julien received a viral test for COVID-19. They were educated on isolation and quarantine as appropriate. For any symptoms, they were directed to seek care from their PCP, given contact information to establish with a doctor, directed to an urgent care or the emergency room.

## 2020-12-08 NOTE — PATIENT INSTRUCTIONS

## 2020-12-10 LAB
ORGANISM: ABNORMAL
SARS-COV-2, NAA: NOT DETECTED
URINE CULTURE, ROUTINE: ABNORMAL

## 2020-12-18 ENCOUNTER — HOSPITAL ENCOUNTER (INPATIENT)
Age: 25
LOS: 2 days | Discharge: HOME | DRG: 540 | End: 2020-12-20
Attending: OBSTETRICS & GYNECOLOGY | Admitting: OBSTETRICS & GYNECOLOGY
Payer: COMMERCIAL

## 2020-12-18 ENCOUNTER — ANESTHESIA EVENT (OUTPATIENT)
Dept: LABOR AND DELIVERY | Age: 25
DRG: 540 | End: 2020-12-18
Payer: COMMERCIAL

## 2020-12-18 ENCOUNTER — ANESTHESIA (OUTPATIENT)
Dept: LABOR AND DELIVERY | Age: 25
DRG: 540 | End: 2020-12-18
Payer: COMMERCIAL

## 2020-12-18 VITALS — TEMPERATURE: 97.2 F | DIASTOLIC BLOOD PRESSURE: 59 MMHG | OXYGEN SATURATION: 98 % | SYSTOLIC BLOOD PRESSURE: 105 MMHG

## 2020-12-18 LAB
ABO/RH: NORMAL
AMPHETAMINE SCREEN, URINE: NORMAL
ANTIBODY SCREEN: NORMAL
BARBITURATE SCREEN URINE: NORMAL
BENZODIAZEPINE SCREEN, URINE: NORMAL
BUPRENORPHINE URINE: NORMAL
CANNABINOID SCREEN URINE: NORMAL
COCAINE METABOLITE SCREEN URINE: NORMAL
HCT VFR BLD CALC: 32.4 % (ref 36–48)
HEMOGLOBIN: 10.6 G/DL (ref 12–16)
Lab: NORMAL
MCH RBC QN AUTO: 28.6 PG (ref 26–34)
MCHC RBC AUTO-ENTMCNC: 32.9 G/DL (ref 31–36)
MCV RBC AUTO: 86.9 FL (ref 80–100)
METHADONE SCREEN, URINE: NORMAL
OPIATE SCREEN URINE: NORMAL
OXYCODONE URINE: NORMAL
PDW BLD-RTO: 14.8 % (ref 12.4–15.4)
PH UA: 6
PHENCYCLIDINE SCREEN URINE: NORMAL
PLATELET # BLD: 298 K/UL (ref 135–450)
PMV BLD AUTO: 7.9 FL (ref 5–10.5)
PROPOXYPHENE SCREEN: NORMAL
RBC # BLD: 3.73 M/UL (ref 4–5.2)
WBC # BLD: 8.1 K/UL (ref 4–11)

## 2020-12-18 PROCEDURE — 6370000000 HC RX 637 (ALT 250 FOR IP): Performed by: OBSTETRICS & GYNECOLOGY

## 2020-12-18 PROCEDURE — 1200000000 HC SEMI PRIVATE

## 2020-12-18 PROCEDURE — 7100000000 HC PACU RECOVERY - FIRST 15 MIN: Performed by: OBSTETRICS & GYNECOLOGY

## 2020-12-18 PROCEDURE — 6360000002 HC RX W HCPCS: Performed by: ANESTHESIOLOGY

## 2020-12-18 PROCEDURE — 2500000003 HC RX 250 WO HCPCS: Performed by: NURSE ANESTHETIST, CERTIFIED REGISTERED

## 2020-12-18 PROCEDURE — 2580000003 HC RX 258: Performed by: NURSE ANESTHETIST, CERTIFIED REGISTERED

## 2020-12-18 PROCEDURE — 51702 INSERT TEMP BLADDER CATH: CPT

## 2020-12-18 PROCEDURE — 6360000002 HC RX W HCPCS: Performed by: OBSTETRICS & GYNECOLOGY

## 2020-12-18 PROCEDURE — 86850 RBC ANTIBODY SCREEN: CPT

## 2020-12-18 PROCEDURE — 2580000003 HC RX 258: Performed by: OBSTETRICS & GYNECOLOGY

## 2020-12-18 PROCEDURE — 3609079900 HC CESAREAN SECTION: Performed by: OBSTETRICS & GYNECOLOGY

## 2020-12-18 PROCEDURE — 3700000001 HC ADD 15 MINUTES (ANESTHESIA): Performed by: OBSTETRICS & GYNECOLOGY

## 2020-12-18 PROCEDURE — 3700000000 HC ANESTHESIA ATTENDED CARE: Performed by: OBSTETRICS & GYNECOLOGY

## 2020-12-18 PROCEDURE — 2500000003 HC RX 250 WO HCPCS: Performed by: OBSTETRICS & GYNECOLOGY

## 2020-12-18 PROCEDURE — 86900 BLOOD TYPING SEROLOGIC ABO: CPT

## 2020-12-18 PROCEDURE — 2709999900 HC NON-CHARGEABLE SUPPLY: Performed by: OBSTETRICS & GYNECOLOGY

## 2020-12-18 PROCEDURE — 86901 BLOOD TYPING SEROLOGIC RH(D): CPT

## 2020-12-18 PROCEDURE — 7100000001 HC PACU RECOVERY - ADDTL 15 MIN: Performed by: OBSTETRICS & GYNECOLOGY

## 2020-12-18 PROCEDURE — 6360000002 HC RX W HCPCS: Performed by: NURSE ANESTHETIST, CERTIFIED REGISTERED

## 2020-12-18 PROCEDURE — 86780 TREPONEMA PALLIDUM: CPT

## 2020-12-18 PROCEDURE — 85027 COMPLETE CBC AUTOMATED: CPT

## 2020-12-18 PROCEDURE — 80307 DRUG TEST PRSMV CHEM ANLYZR: CPT

## 2020-12-18 RX ORDER — SODIUM CHLORIDE 0.9 % (FLUSH) 0.9 %
10 SYRINGE (ML) INJECTION EVERY 12 HOURS SCHEDULED
Status: DISCONTINUED | OUTPATIENT
Start: 2020-12-18 | End: 2020-12-18

## 2020-12-18 RX ORDER — KETOROLAC TROMETHAMINE 30 MG/ML
INJECTION, SOLUTION INTRAMUSCULAR; INTRAVENOUS PRN
Status: DISCONTINUED | OUTPATIENT
Start: 2020-12-18 | End: 2020-12-18 | Stop reason: SDUPTHER

## 2020-12-18 RX ORDER — POLYETHYLENE GLYCOL 3350 17 G/17G
17 POWDER, FOR SOLUTION ORAL DAILY
Status: DISCONTINUED | OUTPATIENT
Start: 2020-12-18 | End: 2020-12-20 | Stop reason: HOSPADM

## 2020-12-18 RX ORDER — ONDANSETRON 2 MG/ML
4 INJECTION INTRAMUSCULAR; INTRAVENOUS EVERY 6 HOURS PRN
Status: DISCONTINUED | OUTPATIENT
Start: 2020-12-18 | End: 2020-12-18

## 2020-12-18 RX ORDER — ONDANSETRON 2 MG/ML
INJECTION INTRAMUSCULAR; INTRAVENOUS PRN
Status: DISCONTINUED | OUTPATIENT
Start: 2020-12-18 | End: 2020-12-18 | Stop reason: SDUPTHER

## 2020-12-18 RX ORDER — CARBOPROST TROMETHAMINE 250 UG/ML
250 INJECTION, SOLUTION INTRAMUSCULAR PRN
Status: DISCONTINUED | OUTPATIENT
Start: 2020-12-18 | End: 2020-12-20 | Stop reason: HOSPADM

## 2020-12-18 RX ORDER — PROMETHAZINE HYDROCHLORIDE 25 MG/ML
12.5 INJECTION, SOLUTION INTRAMUSCULAR; INTRAVENOUS ONCE
Status: COMPLETED | OUTPATIENT
Start: 2020-12-18 | End: 2020-12-18

## 2020-12-18 RX ORDER — DOCUSATE SODIUM 100 MG/1
100 CAPSULE, LIQUID FILLED ORAL 2 TIMES DAILY
Status: DISCONTINUED | OUTPATIENT
Start: 2020-12-18 | End: 2020-12-20 | Stop reason: HOSPADM

## 2020-12-18 RX ORDER — SODIUM CHLORIDE, SODIUM LACTATE, POTASSIUM CHLORIDE, CALCIUM CHLORIDE 600; 310; 30; 20 MG/100ML; MG/100ML; MG/100ML; MG/100ML
INJECTION, SOLUTION INTRAVENOUS CONTINUOUS
Status: DISCONTINUED | OUTPATIENT
Start: 2020-12-18 | End: 2020-12-18

## 2020-12-18 RX ORDER — SIMETHICONE 80 MG
80 TABLET,CHEWABLE ORAL EVERY 6 HOURS PRN
Status: DISCONTINUED | OUTPATIENT
Start: 2020-12-18 | End: 2020-12-20 | Stop reason: HOSPADM

## 2020-12-18 RX ORDER — OXYCODONE HYDROCHLORIDE 5 MG/1
5 TABLET ORAL EVERY 6 HOURS PRN
Qty: 28 TABLET | Refills: 0 | Status: SHIPPED | OUTPATIENT
Start: 2020-12-18 | End: 2020-12-25

## 2020-12-18 RX ORDER — MORPHINE SULFATE 1 MG/ML
INJECTION, SOLUTION EPIDURAL; INTRATHECAL; INTRAVENOUS PRN
Status: DISCONTINUED | OUTPATIENT
Start: 2020-12-18 | End: 2020-12-18 | Stop reason: SDUPTHER

## 2020-12-18 RX ORDER — METHYLERGONOVINE MALEATE 0.2 MG/ML
200 INJECTION INTRAVENOUS PRN
Status: DISCONTINUED | OUTPATIENT
Start: 2020-12-18 | End: 2020-12-20 | Stop reason: HOSPADM

## 2020-12-18 RX ORDER — ACETAMINOPHEN 500 MG
1000 TABLET ORAL ONCE
Status: COMPLETED | OUTPATIENT
Start: 2020-12-18 | End: 2020-12-18

## 2020-12-18 RX ORDER — SODIUM CHLORIDE, SODIUM LACTATE, POTASSIUM CHLORIDE, AND CALCIUM CHLORIDE .6; .31; .03; .02 G/100ML; G/100ML; G/100ML; G/100ML
1000 INJECTION, SOLUTION INTRAVENOUS ONCE
Status: COMPLETED | OUTPATIENT
Start: 2020-12-18 | End: 2020-12-18

## 2020-12-18 RX ORDER — FERROUS SULFATE 325(65) MG
325 TABLET ORAL 2 TIMES DAILY WITH MEALS
Status: DISCONTINUED | OUTPATIENT
Start: 2020-12-18 | End: 2020-12-20 | Stop reason: HOSPADM

## 2020-12-18 RX ORDER — ACETAMINOPHEN 500 MG
1000 TABLET ORAL EVERY 8 HOURS
Status: DISCONTINUED | OUTPATIENT
Start: 2020-12-18 | End: 2020-12-20 | Stop reason: HOSPADM

## 2020-12-18 RX ORDER — DEXAMETHASONE SODIUM PHOSPHATE 4 MG/ML
INJECTION, SOLUTION INTRA-ARTICULAR; INTRALESIONAL; INTRAMUSCULAR; INTRAVENOUS; SOFT TISSUE PRN
Status: DISCONTINUED | OUTPATIENT
Start: 2020-12-18 | End: 2020-12-18 | Stop reason: SDUPTHER

## 2020-12-18 RX ORDER — ONDANSETRON 8 MG/1
8 TABLET, ORALLY DISINTEGRATING ORAL EVERY 8 HOURS PRN
Status: DISCONTINUED | OUTPATIENT
Start: 2020-12-18 | End: 2020-12-20 | Stop reason: HOSPADM

## 2020-12-18 RX ORDER — ONDANSETRON 2 MG/ML
4 INJECTION INTRAMUSCULAR; INTRAVENOUS EVERY 6 HOURS PRN
Status: DISCONTINUED | OUTPATIENT
Start: 2020-12-18 | End: 2020-12-20 | Stop reason: HOSPADM

## 2020-12-18 RX ORDER — OXYCODONE HYDROCHLORIDE 5 MG/1
5 TABLET ORAL EVERY 4 HOURS PRN
Status: DISCONTINUED | OUTPATIENT
Start: 2020-12-18 | End: 2020-12-20 | Stop reason: HOSPADM

## 2020-12-18 RX ORDER — BISACODYL 10 MG
10 SUPPOSITORY, RECTAL RECTAL DAILY PRN
Status: DISCONTINUED | OUTPATIENT
Start: 2020-12-18 | End: 2020-12-20 | Stop reason: HOSPADM

## 2020-12-18 RX ORDER — KETOROLAC TROMETHAMINE 30 MG/ML
30 INJECTION, SOLUTION INTRAMUSCULAR; INTRAVENOUS EVERY 8 HOURS
Status: DISCONTINUED | OUTPATIENT
Start: 2020-12-18 | End: 2020-12-19

## 2020-12-18 RX ORDER — SODIUM CHLORIDE, SODIUM LACTATE, POTASSIUM CHLORIDE, CALCIUM CHLORIDE 600; 310; 30; 20 MG/100ML; MG/100ML; MG/100ML; MG/100ML
INJECTION, SOLUTION INTRAVENOUS CONTINUOUS
Status: DISCONTINUED | OUTPATIENT
Start: 2020-12-18 | End: 2020-12-20 | Stop reason: HOSPADM

## 2020-12-18 RX ORDER — FENTANYL CITRATE 50 UG/ML
INJECTION, SOLUTION INTRAMUSCULAR; INTRAVENOUS PRN
Status: DISCONTINUED | OUTPATIENT
Start: 2020-12-18 | End: 2020-12-18 | Stop reason: SDUPTHER

## 2020-12-18 RX ORDER — PHENYLEPHRINE HYDROCHLORIDE 10 MG/ML
INJECTION INTRAVENOUS PRN
Status: DISCONTINUED | OUTPATIENT
Start: 2020-12-18 | End: 2020-12-18 | Stop reason: SDUPTHER

## 2020-12-18 RX ORDER — SODIUM CHLORIDE 0.9 % (FLUSH) 0.9 %
10 SYRINGE (ML) INJECTION PRN
Status: DISCONTINUED | OUTPATIENT
Start: 2020-12-18 | End: 2020-12-20 | Stop reason: HOSPADM

## 2020-12-18 RX ORDER — FAMOTIDINE 20 MG/1
20 TABLET, FILM COATED ORAL 2 TIMES DAILY PRN
Status: DISCONTINUED | OUTPATIENT
Start: 2020-12-18 | End: 2020-12-20 | Stop reason: HOSPADM

## 2020-12-18 RX ORDER — OXYCODONE HYDROCHLORIDE 5 MG/1
10 TABLET ORAL EVERY 4 HOURS PRN
Status: DISCONTINUED | OUTPATIENT
Start: 2020-12-18 | End: 2020-12-20 | Stop reason: HOSPADM

## 2020-12-18 RX ORDER — IBUPROFEN 800 MG/1
800 TABLET ORAL EVERY 6 HOURS PRN
Qty: 60 TABLET | Refills: 0 | Status: SHIPPED | OUTPATIENT
Start: 2020-12-18

## 2020-12-18 RX ORDER — BUPIVACAINE HYDROCHLORIDE 7.5 MG/ML
INJECTION, SOLUTION INTRASPINAL PRN
Status: DISCONTINUED | OUTPATIENT
Start: 2020-12-18 | End: 2020-12-18 | Stop reason: SDUPTHER

## 2020-12-18 RX ORDER — CEPHALEXIN 500 MG/1
500 CAPSULE ORAL 3 TIMES DAILY
Status: ON HOLD | COMMUNITY
End: 2020-12-18

## 2020-12-18 RX ORDER — IBUPROFEN 800 MG/1
800 TABLET ORAL EVERY 8 HOURS
Status: DISCONTINUED | OUTPATIENT
Start: 2020-12-18 | End: 2020-12-20 | Stop reason: HOSPADM

## 2020-12-18 RX ORDER — MISOPROSTOL 100 UG/1
800 TABLET ORAL PRN
Status: DISCONTINUED | OUTPATIENT
Start: 2020-12-18 | End: 2020-12-20 | Stop reason: HOSPADM

## 2020-12-18 RX ORDER — MODIFIED LANOLIN
OINTMENT (GRAM) TOPICAL
Status: DISCONTINUED | OUTPATIENT
Start: 2020-12-18 | End: 2020-12-20 | Stop reason: HOSPADM

## 2020-12-18 RX ORDER — SODIUM CHLORIDE, SODIUM LACTATE, POTASSIUM CHLORIDE, CALCIUM CHLORIDE 600; 310; 30; 20 MG/100ML; MG/100ML; MG/100ML; MG/100ML
INJECTION, SOLUTION INTRAVENOUS CONTINUOUS PRN
Status: DISCONTINUED | OUTPATIENT
Start: 2020-12-18 | End: 2020-12-18 | Stop reason: SDUPTHER

## 2020-12-18 RX ORDER — DIPHENHYDRAMINE HYDROCHLORIDE 50 MG/ML
25 INJECTION INTRAMUSCULAR; INTRAVENOUS EVERY 6 HOURS PRN
Status: DISCONTINUED | OUTPATIENT
Start: 2020-12-18 | End: 2020-12-20 | Stop reason: HOSPADM

## 2020-12-18 RX ORDER — SODIUM CHLORIDE 0.9 % (FLUSH) 0.9 %
10 SYRINGE (ML) INJECTION EVERY 12 HOURS SCHEDULED
Status: DISCONTINUED | OUTPATIENT
Start: 2020-12-18 | End: 2020-12-20 | Stop reason: HOSPADM

## 2020-12-18 RX ORDER — METOCLOPRAMIDE HYDROCHLORIDE 5 MG/ML
10 INJECTION INTRAMUSCULAR; INTRAVENOUS ONCE
Status: COMPLETED | OUTPATIENT
Start: 2020-12-18 | End: 2020-12-18

## 2020-12-18 RX ORDER — SENNA AND DOCUSATE SODIUM 50; 8.6 MG/1; MG/1
1 TABLET, FILM COATED ORAL DAILY PRN
Status: DISCONTINUED | OUTPATIENT
Start: 2020-12-18 | End: 2020-12-20 | Stop reason: HOSPADM

## 2020-12-18 RX ORDER — TRISODIUM CITRATE DIHYDRATE AND CITRIC ACID MONOHYDRATE 500; 334 MG/5ML; MG/5ML
30 SOLUTION ORAL ONCE
Status: COMPLETED | OUTPATIENT
Start: 2020-12-18 | End: 2020-12-18

## 2020-12-18 RX ORDER — SODIUM CHLORIDE 0.9 % (FLUSH) 0.9 %
10 SYRINGE (ML) INJECTION PRN
Status: DISCONTINUED | OUTPATIENT
Start: 2020-12-18 | End: 2020-12-18

## 2020-12-18 RX ADMIN — SODIUM CHLORIDE, POTASSIUM CHLORIDE, SODIUM LACTATE AND CALCIUM CHLORIDE: 600; 310; 30; 20 INJECTION, SOLUTION INTRAVENOUS at 13:36

## 2020-12-18 RX ADMIN — OXYCODONE HYDROCHLORIDE 5 MG: 5 TABLET ORAL at 21:01

## 2020-12-18 RX ADMIN — KETOROLAC TROMETHAMINE 30 MG: 30 INJECTION, SOLUTION INTRAMUSCULAR at 22:18

## 2020-12-18 RX ADMIN — SODIUM CITRATE AND CITRIC ACID MONOHYDRATE 30 ML: 500; 334 SOLUTION ORAL at 12:50

## 2020-12-18 RX ADMIN — ONDANSETRON 4 MG: 2 INJECTION INTRAMUSCULAR; INTRAVENOUS at 13:38

## 2020-12-18 RX ADMIN — MORPHINE SULFATE 0.2 MG: 1 INJECTION EPIDURAL; INTRATHECAL; INTRAVENOUS at 13:41

## 2020-12-18 RX ADMIN — METHYLERGONOVINE MALEATE 200 MCG: 0.2 INJECTION INTRAVENOUS at 14:10

## 2020-12-18 RX ADMIN — METOCLOPRAMIDE HYDROCHLORIDE 10 MG: 5 INJECTION INTRAMUSCULAR; INTRAVENOUS at 12:50

## 2020-12-18 RX ADMIN — ONDANSETRON 4 MG: 2 INJECTION INTRAMUSCULAR; INTRAVENOUS at 15:37

## 2020-12-18 RX ADMIN — ACETAMINOPHEN 1000 MG: 500 TABLET, FILM COATED ORAL at 12:51

## 2020-12-18 RX ADMIN — FENTANYL CITRATE 10 MCG: 50 INJECTION, SOLUTION INTRAMUSCULAR; INTRAVENOUS at 13:41

## 2020-12-18 RX ADMIN — Medication 495 ML/HR: at 14:01

## 2020-12-18 RX ADMIN — FAMOTIDINE 20 MG: 10 INJECTION, SOLUTION INTRAVENOUS at 12:50

## 2020-12-18 RX ADMIN — SODIUM CHLORIDE, POTASSIUM CHLORIDE, SODIUM LACTATE AND CALCIUM CHLORIDE 1000 ML: 600; 310; 30; 20 INJECTION, SOLUTION INTRAVENOUS at 09:00

## 2020-12-18 RX ADMIN — PROMETHAZINE HYDROCHLORIDE 12.5 MG: 25 INJECTION INTRAMUSCULAR; INTRAVENOUS at 17:17

## 2020-12-18 RX ADMIN — ACETAMINOPHEN 1000 MG: 500 TABLET, FILM COATED ORAL at 21:00

## 2020-12-18 RX ADMIN — SODIUM CHLORIDE, POTASSIUM CHLORIDE, SODIUM LACTATE AND CALCIUM CHLORIDE: 600; 310; 30; 20 INJECTION, SOLUTION INTRAVENOUS at 19:30

## 2020-12-18 RX ADMIN — DOCUSATE SODIUM 100 MG: 100 CAPSULE ORAL at 21:00

## 2020-12-18 RX ADMIN — CEFAZOLIN SODIUM 2 G: 10 INJECTION, POWDER, FOR SOLUTION INTRAVENOUS at 12:51

## 2020-12-18 RX ADMIN — SODIUM CHLORIDE, POTASSIUM CHLORIDE, SODIUM LACTATE AND CALCIUM CHLORIDE: 600; 310; 30; 20 INJECTION, SOLUTION INTRAVENOUS at 09:46

## 2020-12-18 RX ADMIN — BUPIVACAINE HYDROCHLORIDE 1.5 ML: 7.5 INJECTION, SOLUTION INTRASPINAL at 13:41

## 2020-12-18 RX ADMIN — KETOROLAC TROMETHAMINE 30 MG: 30 INJECTION, SOLUTION INTRAMUSCULAR; INTRAVENOUS at 14:06

## 2020-12-18 RX ADMIN — PHENYLEPHRINE HYDROCHLORIDE 100 MCG: 10 INJECTION INTRAVENOUS at 13:48

## 2020-12-18 RX ADMIN — DEXAMETHASONE SODIUM PHOSPHATE 8 MG: 4 INJECTION, SOLUTION INTRAMUSCULAR; INTRAVENOUS at 14:06

## 2020-12-18 RX ADMIN — PHENYLEPHRINE HYDROCHLORIDE 100 MCG: 10 INJECTION INTRAVENOUS at 13:49

## 2020-12-18 ASSESSMENT — PULMONARY FUNCTION TESTS
PIF_VALUE: 0
PIF_VALUE: 0
PIF_VALUE: 1
PIF_VALUE: 1
PIF_VALUE: 0
PIF_VALUE: 1
PIF_VALUE: 0
PIF_VALUE: 1

## 2020-12-18 ASSESSMENT — PAIN SCALES - GENERAL
PAINLEVEL_OUTOF10: 2
PAINLEVEL_OUTOF10: 0
PAINLEVEL_OUTOF10: 6

## 2020-12-18 NOTE — PROGRESS NOTES
Discharge prescriptions for Motrin and Roxicodone given to pt with instructions on use and side effects. See AVS.  Pt verbalized understanding of medications.

## 2020-12-18 NOTE — H&P
Department of Obstetrics and Gynecology   Obstetrics History and Physical        CHIEF COMPLAINT:  Admitted for RCS    HISTORY OF PRESENT ILLNESS:      The patient is a 23 y.o. female at Unknown. OB History        2    Para   1    Term   1            AB        Living   1       SAB        TAB        Ectopic        Molar        Multiple   0    Live Births   1            Patient presents with a chief complaint as above and is being admitted for RCS    Estimated Due Date: Estimated Date of Delivery: None noted. PRENATAL CARE:    Complicated by: prior CS, desires RCS, Hx of depression    PAST OB HISTORY:  OB History        2    Para   1    Term   1            AB        Living   1       SAB        TAB        Ectopic        Molar        Multiple   0    Live Births   1                Past Medical History:        Diagnosis Date    Depression     hospitalized in the past  no meds @ present    Depression with suicidal ideation 2016    [address] records. \"Intentional Suicidal Attempt\"     Suicidal behavior with attempted self-injury [facility]) 2016    [address], Care Everywhere records     Past Surgical History:        Procedure Laterality Date     SECTION N/A 3/24/2019     SECTION performed by Michelle Quinones MD at [facility] L[facility] OR     Allergies:  Patient has no known allergies.     Social History:    Social History     Socioeconomic History    Marital status: Single     Spouse name: Not on file    Number of children: Not on file    Years of education: Not on file    Highest education level: Not on file   Occupational History    Not on file   Social Needs    Financial resource strain: Not on file    Food insecurity     Worry: Not on file     Inability: Not on file    Transportation needs     Medical: Not on file     Non-medical: Not on file   Tobacco Use    Smoking status: Never Smoker    Smokeless tobacco: Never Used MCV 96.3 08/24/2020    MCH 32.7 08/24/2020    MCHC 34.0 08/24/2020    RDW 14.0 08/24/2020     08/24/2020    MPV 8.4 08/24/2020       ASSESSMENT AND PLAN:    For RCS: proc and all r/b/a d/w her  Fetus: reassuring

## 2020-12-18 NOTE — PROGRESS NOTES
Patient transferred to postpartum by self and settled into postpartum room. Pt oriented to folder and postpartum care. Oriented to call light, phone and ordering meals. Postpartum RN's name and phone number posted for pt. Siderails up x2. Pt oriented to equipment. Report given to Kike Red RN. Pt included in discussion and all questions answered.

## 2020-12-18 NOTE — ANESTHESIA PROCEDURE NOTES
Spinal Block    Patient location during procedure: OR  Start time: 12/18/2020 1:36 PM  End time: 12/18/2020 1:42 PM  Reason for block: primary anesthetic  Staffing  Performed: resident/CRNA   Anesthesiologist: Eliana Juares MD  Resident/CRNA: EVERETTE Noriega CRNA  Preanesthetic Checklist  Completed: patient identified, IV checked, site marked, risks and benefits discussed, surgical consent, monitors and equipment checked, pre-op evaluation, timeout performed, anesthesia consent given, oxygen available and patient being monitored  Spinal Block  Patient position: sitting  Prep: ChloraPrep and x2  Patient monitoring: cardiac monitor, continuous pulse ox and frequent blood pressure checks  Approach: midline  Location: L4/L5  Provider prep: mask and sterile gloves  Local infiltration: lidocaine  Agent: bupivacaine  Dose: 1.5  Dose: 1.5  Needle  Needle type: pencil-tip   Needle gauge: 25 G  Needle length: 3.5 in  Assessment  Sensory level: T6  Swirl obtained: Yes  CSF: clear  Attempts: 1  Hemodynamics: stable  Additional Notes  Atraumatic single pass

## 2020-12-18 NOTE — DISCHARGE SUMMARY
Obstetrical Discharge Form    Gestational Age: 39w3d    Antepartum complications: Prior CS, Hx of depression    Date of Delivery: 20      Type of Delivery: RLTCS    Delivered By: Will Card     Baby:      Information for the patient's :  Ana Mane [[id number]]   APGAR One: 9     Information for the patient's :  Ana Mane [[id number]]   APGAR Five: 9     Information for the patient's :  Ana Mane [[id number]]   Birth Weight: 9 lb 3 oz (4.167 kg)       Anesthesia: Spinal    Intrapartum complications: None    Postpartum complications: anemia    CBC:   Lab Results   Component Value Date    WBC 15.4 2020    RBC 3.35 2020    HGB 9.3 2020    HCT 29.3 2020    MCV 87.6 2020    MCH 27.8 2020    MCHC 31.7 2020    RDW 14.8 2020     2020    MPV 7.8 2020         Discharge Medication:    Елена Chandler, [address] [facility] [address] Medication Instructions GJL:[id number]    Printed on:20 1128   Medication Information                      ferrous sulfate (IRON 325) 325 (65 Fe) MG tablet  Take 1 tablet by mouth daily (with breakfast)             ibuprofen (ADVIL;MOTRIN) 800 MG tablet  Take 1 tablet by mouth every 6 hours as needed for Pain             oxyCODONE (ROXICODONE) 5 MG immediate release tablet  Take 1 tablet by mouth every 6 hours as needed for Pain for up to 7 days. Intended supply: 7 days. Take lowest dose possible to manage pain             Prenatal Multivit-Min-Fe-FA (PRENATAL VITAMINS PO)  Take 1 tablet by mouth daily                  Discharge Condition:  good    Discharge Date: 20    PLAN:  Follow up in 6 weeks for routine PP visit  All questions answered  D/C summary begun at delivery for D/C planning purposes, any delay in discharge from ordered D/C date due to  factors.

## 2020-12-18 NOTE — OP NOTE
Operative Note      Patient: Antelmo Mack  YOB: 1995  MRN: [id number]    Date of Procedure: 2020    Pre-Op Diagnosis: Repeat CS, declined ,     Post-Op Diagnosis: Same       Procedure(s):   SECTION: LTCS    Surgeon(s):  Ivonne Edouard MD    Assistant:   * No surgical staff found *    Anesthesia: Spinal    Estimated Blood Loss (mL): 118    Complications: None    Specimens:   * No specimens in log *    Implants:  * No implants in log *      Drains:   Urethral Catheter Non-latex; Double-lumen;Straight-tip 16 fr (Active)       Specimens:  none    Cord Gases: no      Information for the patient's :  Jil Veliz [[id number]]          Condition:    Infant stable, transfer to General Care Nursery  Mother stable, transfer to post anesthesia recovery    OPERATIVE INDICATIONS:  The patient is a [age] [facility] y.o., White female   at 38w3d who is admitted for repeat. She was adequately counseled regarding all risks, benefits and alternatives and consented for the procedure. OPERATIVE FINDINGS:   Information for the patient's :  Corinne Mckeon [[id number]]      . Delivered via low transverse  section. Amniotic fluid was clear. Placenta was  healthy. Uterus, tubes and ovaries were normal.      OPERATIVE DETAILS:  The patient was taken to the operating room and given spinal.  She was placed in the supine position with 15 degree  Left lateral tilt. The surgical site was prepped and draped and a Rae catheter was placed. A Pfannenstiel  incision was made through a previous incision and  opening was done in a routine fashion. After opening the peritoneal cavity, lower uterine segment was inspected and found to be adequate. A bladder flap  was now created and bladder was dissected down using Metzenbaum scissors. At this point a transverse incision was made in the lower uterine segment and extended adequately on either side.   The fetus was delivered from its Cephalic presentation without any difficulty. Cord was clamped and cut and the baby was handed over to the nursing staff. The placenta with its membranes was now removed. The uterus was now cleared of all membranes and blood clots. The uterine incision was closed in 2 layers first with 0 Vicryl continuous locking and second with an imbricating layer. The visceral peritoneum was now closed with 2-0 Vicryl. The parietal peritoneum was closed with 2-0 Vicryl. Hemostasis was confirmed at each level of the procedure. The rectus muscle was now closed with interrupted figure-of-eight sutures with 2-0  Vicryl. The rectus fascia was closed with 0 Vicryl continuous locking with 2 sutures beginning at either end of the incision and meeting in the midline. At this point hemostasis was again confirmed. The subcutaneous tissue was closed with 3-0 Vicryl and skin with staples (s). A dry dressing was applied to the wound. The uterus was massaged and the vagina was emptied of all blood clots. At this point all instrument, needle and sponge counts were correct  x3.      Milton Henry      Electronically signed by Milton Henry MD on 12/18/2020 at 2:27 PM

## 2020-12-18 NOTE — ANESTHESIA PRE PROCEDURE
Department of Anesthesiology  Preprocedure Note       Name:  Loman Habermann   Age:  23 y.o.  :  1995                                          MRN:  [id number]         Date:  2020      Surgeon: Edwar Barnard):  Corrine Fan MD    Procedure: Procedure(s):   SECTION    Medications prior to admission:   Prior to Admission medications    Medication Sig Start Date End Date Taking?  Authorizing Provider   cephALEXin (KEFLEX) 500 MG capsule Take 500 mg by mouth 3 times daily    Yes Historical Provider, MD   Prenatal Multivit-Min-Fe-FA (PRENATAL VITAMINS PO) Take 1 tablet by mouth daily   Yes Historical Provider, MD       Current medications:    Current Facility-Administered Medications   Medication Dose Route Frequency Provider Last Rate Last Admin    lactated ringers infusion   Intravenous Continuous Corrine Fan  mL/hr at 20 0946 New Bag at 20 0946    lactated ringers bolus  1,000 mL Intravenous Once Corrine Fan MD        sodium chloride flush 0.9 % injection 10 mL  10 mL Intravenous 2 times per day Corrine Fan MD        sodium chloride flush 0.9 % injection 10 mL  10 mL Intravenous PRN Corrine Fan MD        citric acid-sodium citrate (BICITRA) solution 30 mL  30 mL Oral Once Corrine Fan MD        acetaminophen (TYLENOL) tablet 1,000 mg  1,000 mg Oral Once Corrine Fan MD        famotidine (PEPCID) injection 20 mg  20 mg Intravenous Once Corrine Fan MD        metoclopramide (REGLAN) injection 10 mg  10 mg Intravenous Once Corrine Fan MD        topical skin adhesive stick   Topical Once Corrine Fan MD        oxytocin (PITOCIN) 10 unit bolus from the bag  10 Units Intravenous PRN Corrine Fan MD        And    oxytocin (PITOCIN) 30 units in 500 mL infusion  87.3 vicky-units/min Intravenous PRN Corrine Fan MD  ondansetron (ZOFRAN) injection 4 mg  4 mg Intravenous Q6H PRN Sandy Landers MD        ceFAZolin (ANCEF) 2 g in dextrose 5 % 100 mL IVPB  2 g Intravenous Once Sandy Landers MD           Allergies:  No Known Allergies    Problem List:    Patient Active Problem List   Diagnosis Code    40 weeks gestation of pregnancy Z3A.40    Delivery of pregnancy by  section O82    Postpartum care following  delivery Z39.2    Postoperative pain G89.18       Past Medical History:        Diagnosis Date    Depression     hospitalized in the past  no meds @ present    Depression with suicidal ideation 2016    [facility] [place], [place], Care Everywhere records. \"Intentional Suicidal Attempt\"     Suicidal behavior with attempted self-injury [facility]) 2016    [address], Care Everywhere records       Past Surgical History:        Procedure Laterality Date     SECTION N/A 3/24/2019     SECTION performed by Felicita Fields MD at [address] L&D OR       Social History:    Social History     Tobacco Use    Smoking status: Never Smoker    Smokeless tobacco: Never Used   Substance Use Topics    Alcohol use:  No                                Counseling given: Not Answered      Vital Signs (Current):   Vitals:    20 0835 20 0845   BP: 111/65    Pulse: 83    Resp: 18    Temp:  36.7 °C (98.1 °F)   TempSrc:  Oral   Weight:  173 lb (78.5 kg)   Height:  5' 1\" (1.549 m)                                              BP Readings from Last 3 Encounters:   20 111/65   19 121/84   19 121/71       NPO Status: Time of last liquid consumption:                         Time of last solid consumption:                         Date of last liquid consumption: 20                        Date of last solid food consumption: 20    BMI:   Wt Readings from Last 3 Encounters:   20 173 lb (78.5 kg)   19 186 lb (84.4 kg) 11/29/18 158 lb 12.8 oz (72 kg)     Body mass index is 32.69 kg/m². CBC:   Lab Results   Component Value Date    WBC 8.1 12/18/2020    RBC 3.73 12/18/2020    HGB 10.6 12/18/2020    HCT 32.4 12/18/2020    MCV 86.9 12/18/2020    RDW 14.8 12/18/2020     12/18/2020       CMP: No results found for: NA, K, CL, CO2, BUN, CREATININE, GFRAA, AGRATIO, LABGLOM, GLUCOSE, PROT, CALCIUM, BILITOT, ALKPHOS, AST, ALT    POC Tests: No results for input(s): POCGLU, POCNA, POCK, POCCL, POCBUN, POCHEMO, POCHCT in the last 72 hours. Coags: No results found for: PROTIME, INR, APTT    HCG (If Applicable): No results found for: PREGTESTUR, PREGSERUM, HCG, HCGQUANT     ABGs: No results found for: PHART, PO2ART, UZQ6NAO, HNT6VSK, BEART, S8MQSLOZ     Type & Screen (If Applicable):  No results found for: LABABO, LABRH    Drug/Infectious Status (If Applicable):  Lab Results   Component Value Date    HEPCAB Negative 10/10/2018       COVID-19 Screening (If Applicable):   Lab Results   Component Value Date    COVID19 NOT DETECTED 12/08/2020         Anesthesia Evaluation  Patient summary reviewed and Nursing notes reviewed  Airway: Mallampati: II  TM distance: >3 FB   Neck ROM: full  Mouth opening: > = 3 FB Dental: normal exam         Pulmonary:Negative Pulmonary ROS and normal exam                               Cardiovascular:Negative CV ROS                      Neuro/Psych:   (+) psychiatric history: stable without treatment            GI/Hepatic/Renal:            ROS comment: On antibiotics for UTI. Endo/Other: Negative Endo/Other ROS                    Abdominal:           Vascular: negative vascular ROS.                                        Anesthesia Plan      spinal     ASA 2     (Lab Results       Component                Value               Date                       WBC                      8.1                 12/18/2020                 HGB                      10.6 (L)            12/18/2020 HCT                      32.4 (L)            12/18/2020                 MCV                      86.9                12/18/2020                 PLT                      298                 12/18/2020            Height: 5' 1\" (1.549 m), Weight: 173 lb (78.5 kg), BP: 111/65  Discussed spinal procedure and risks including but not limited to infection, changes in VS, n/v, severe headache, paresthesia, intravascular injection, rare temporary or permanent injury and failed block with GETA back up. Discussed the addition of duramorph to the spinal.  Patient understands the side effects of duramorph and agrees to its utilization.)        Anesthetic plan and risks discussed with patient and Unable to obtain due to emergent nature. Use of blood products discussed with patient whom consented to blood products. Plan discussed with CRNA and attending.     Attending anesthesiologist reviewed and agrees with Pre Eval content              EVERETTE Samuels - CRNA   12/18/2020

## 2020-12-18 NOTE — PROGRESS NOTES
Pt declining  consult/resources for history of depression with suicidal ideation.  Denies at this time, states all resolved and in the past.

## 2020-12-19 LAB
HCT VFR BLD CALC: 29.3 % (ref 36–48)
HEMOGLOBIN: 9.3 G/DL (ref 12–16)
MCH RBC QN AUTO: 27.8 PG (ref 26–34)
MCHC RBC AUTO-ENTMCNC: 31.7 G/DL (ref 31–36)
MCV RBC AUTO: 87.6 FL (ref 80–100)
PDW BLD-RTO: 14.8 % (ref 12.4–15.4)
PLATELET # BLD: 285 K/UL (ref 135–450)
PMV BLD AUTO: 7.8 FL (ref 5–10.5)
RBC # BLD: 3.35 M/UL (ref 4–5.2)
TOTAL SYPHILLIS IGG/IGM: NORMAL
WBC # BLD: 15.4 K/UL (ref 4–11)

## 2020-12-19 PROCEDURE — 1200000000 HC SEMI PRIVATE

## 2020-12-19 PROCEDURE — 6370000000 HC RX 637 (ALT 250 FOR IP): Performed by: OBSTETRICS & GYNECOLOGY

## 2020-12-19 PROCEDURE — 85027 COMPLETE CBC AUTOMATED: CPT

## 2020-12-19 RX ADMIN — DOCUSATE SODIUM 100 MG: 100 CAPSULE ORAL at 21:11

## 2020-12-19 RX ADMIN — ACETAMINOPHEN 1000 MG: 500 TABLET, FILM COATED ORAL at 12:49

## 2020-12-19 RX ADMIN — ACETAMINOPHEN 1000 MG: 500 TABLET, FILM COATED ORAL at 03:29

## 2020-12-19 RX ADMIN — SIMETHICONE 80 MG: 80 TABLET, CHEWABLE ORAL at 21:16

## 2020-12-19 RX ADMIN — ACETAMINOPHEN 1000 MG: 500 TABLET, FILM COATED ORAL at 21:11

## 2020-12-19 RX ADMIN — IBUPROFEN 800 MG: 800 TABLET, FILM COATED ORAL at 22:41

## 2020-12-19 RX ADMIN — OXYCODONE HYDROCHLORIDE 10 MG: 5 TABLET ORAL at 16:35

## 2020-12-19 RX ADMIN — DOCUSATE SODIUM 100 MG: 100 CAPSULE ORAL at 10:15

## 2020-12-19 RX ADMIN — IBUPROFEN 800 MG: 800 TABLET, FILM COATED ORAL at 06:13

## 2020-12-19 RX ADMIN — IBUPROFEN 800 MG: 800 TABLET, FILM COATED ORAL at 14:31

## 2020-12-19 ASSESSMENT — PAIN SCALES - GENERAL
PAINLEVEL_OUTOF10: 7
PAINLEVEL_OUTOF10: 4
PAINLEVEL_OUTOF10: 5
PAINLEVEL_OUTOF10: 6
PAINLEVEL_OUTOF10: 5
PAINLEVEL_OUTOF10: 3
PAINLEVEL_OUTOF10: 4

## 2020-12-19 NOTE — PLAN OF CARE
Problem: Discharge Planning:  Goal: Discharged to appropriate level of care  Description: Discharged to appropriate level of care  Outcome: Met This Shift     Problem: Fluid Volume - Imbalance:  Goal: Absence of postpartum hemorrhage signs and symptoms  Description: Absence of postpartum hemorrhage signs and symptoms  Outcome: Met This Shift  Goal: Absence of imbalanced fluid volume signs and symptoms  Description: Absence of imbalanced fluid volume signs and symptoms  Outcome: Met This Shift     Problem: Infection - Surgical Site:  Goal: Will show no infection signs and symptoms  Description: Will show no infection signs and symptoms  Outcome: Met This Shift     Problem: Mood - Altered:  Goal: Mood stable  Description: Mood stable  Outcome: Met This Shift     Problem: Nausea/Vomiting:  Goal: Absence of nausea/vomiting  Description: Absence of nausea/vomiting  Outcome: Met This Shift     Problem: Urinary Retention:  Goal: Urinary elimination within specified parameters  Description: Urinary elimination within specified parameters  Outcome: Met This Shift     Problem: Venous Thromboembolism:  Goal: Will show no signs or symptoms of venous thromboembolism  Description: Will show no signs or symptoms of venous thromboembolism  Outcome: Met This Shift  Goal: Absence of signs or symptoms of impaired coagulation  Description: Absence of signs or symptoms of impaired coagulation  Outcome: Met This Shift     Problem: Pain - Acute:  Goal: Pain level will decrease  Description: Pain level will decrease  Outcome: Ongoing     Problem: Pain:  Goal: Pain level will decrease  Description: Pain level will decrease  Outcome: Ongoing  Goal: Control of acute pain  Description: Control of acute pain  Outcome: Ongoing

## 2020-12-19 NOTE — LACTATION NOTE
This note was copied from a baby's chart. LC to bedside. Referral stated MOB wants to pump and bottle feed. MOB stated she does not want to start pumping tonight. Discussed with MOB that if she is planning on pumping she needs to start as soon as possible. MOB stated she is going to wait until the morning. MOB stated she did not produce enough with her first and she knows she will have to do both pumped breastmilk and formula. ANÍBAL has her Medela pump for home use with her and she stated she wants to make sure she has all the parts. MOB stated she wants to do this tomorrow. No other questions at this time.

## 2020-12-19 NOTE — ANESTHESIA POSTPROCEDURE EVALUATION
Department of Anesthesiology  Postprocedure Note    Patient: Jermaine Walter  MRN: [id number]  YOB: 1995  Date of evaluation: 2020  Time:  8:56 AM     Procedure Summary     Date: 20 Room / Location: [facility]    Anesthesia Start: 1102 Anesthesia Stop: 3764    Procedure:  SECTION (N/A Abdomen) Diagnosis: (Repeat)    Surgeons: David Walker MD Responsible Provider: Suni Pierre MD    Anesthesia Type: spinal ASA Status: 2          Anesthesia Type: spinal    Jovi Phase I: Jovi Score: 9    Jovi Phase II: Jovi Score: 9    Last vitals: Reviewed and per EMR flowsheets. Anesthesia Post Evaluation    Patient location during evaluation: floor  Patient participation: complete - patient participated  Level of consciousness: awake and alert  Pain score: 0  Airway patency: patent  Nausea & Vomiting: no nausea and no vomiting  Complications: no  Cardiovascular status: blood pressure returned to baseline  Respiratory status: acceptable  Hydration status: euvolemic  Comments: Patient s/p Spinal for C/S. Pt denies residual numbness post block. Patient is ambulating and voiding without difficulty. Patient denies back pain, headache, paresthesias, pruritus or n/v.  Spinal site is free of signs of infection.

## 2020-12-20 VITALS
RESPIRATION RATE: 18 BRPM | HEART RATE: 90 BPM | BODY MASS INDEX: 32.66 KG/M2 | HEIGHT: 61 IN | SYSTOLIC BLOOD PRESSURE: 101 MMHG | WEIGHT: 173 LBS | OXYGEN SATURATION: 99 % | DIASTOLIC BLOOD PRESSURE: 67 MMHG | TEMPERATURE: 97.9 F

## 2020-12-20 PROBLEM — Z98.891 S/P CESAREAN SECTION: Status: ACTIVE | Noted: 2020-12-20

## 2020-12-20 PROCEDURE — 6370000000 HC RX 637 (ALT 250 FOR IP): Performed by: OBSTETRICS & GYNECOLOGY

## 2020-12-20 RX ORDER — FERROUS SULFATE 325(65) MG
325 TABLET ORAL
Qty: 60 TABLET | Refills: 0 | COMMUNITY
Start: 2020-12-20

## 2020-12-20 RX ADMIN — IBUPROFEN 800 MG: 800 TABLET, FILM COATED ORAL at 07:11

## 2020-12-20 RX ADMIN — DOCUSATE SODIUM 100 MG: 100 CAPSULE ORAL at 10:04

## 2020-12-20 RX ADMIN — FERROUS SULFATE TAB 325 MG (65 MG ELEMENTAL FE) 325 MG: 325 (65 FE) TAB at 10:05

## 2020-12-20 RX ADMIN — ACETAMINOPHEN 1000 MG: 500 TABLET, FILM COATED ORAL at 05:04

## 2020-12-20 ASSESSMENT — PAIN SCALES - GENERAL
PAINLEVEL_OUTOF10: 3
PAINLEVEL_OUTOF10: 2

## 2020-12-20 NOTE — PROGRESS NOTES
Subjective:     Postpartum Day 2:  Delivery    The patient feels well. The patient denies emotional concerns. Pain is well controlled with current medications. The baby iswell. Baby is feeding via bottle - . Urinary output is adequate. The patient is ambulating well. The patient is tolerating a normal diet. Flatus has been passed. Objective:      Patient Vitals for the past 8 hrs:   BP Temp Temp src Pulse   17 0841 130/86 97 °F (36.1 °C) Oral 62     General:    alert, appears stated age, cooperative and no distress       Lochia:  appropriate   Uterine Fundus:   firm   Incision:  healing well, no significant drainage, no dehiscence, no significant erythema   DVT Evaluation:  No evidence of DVT seen on physical exam.       CBC:   Lab Results   Component Value Date    WBC 15.4 2020    RBC 3.35 2020    HGB 9.3 2020    HCT 29.3 2020    MCV 87.6 2020    MCH 27.8 2020    MCHC 31.7 2020    RDW 14.8 2020     2020    MPV 7.8 2020       Assessment:     Status post  section. Doing well postoperatively. Anemia    Plan:     Discharge home with standard precautions and return to clinic in 4-6 weeks  Iron for anemia.

## 2020-12-30 ENCOUNTER — FOLLOWUP TELEPHONE ENCOUNTER (OUTPATIENT)
Dept: OBGYN | Age: 25
End: 2020-12-30

## (undated) DEVICE — SUTURE VCRL SZ 2-0 L36IN ABSRB UD L36MM CT-1 1/2 CIR J945H

## (undated) DEVICE — PACK PROCEDURE SURG C SECT REV 1

## (undated) DEVICE — BLADE CLIPPER GEN PURP NS

## (undated) DEVICE — TRAY URIN CATH 16FR DRNGE BG STATLOK STBL DEV F SURSTP

## (undated) DEVICE — 9165 UNIVERSAL PATIENT PLATE: Brand: 3M™

## (undated) DEVICE — GARMENT,MEDLINE,DVT,INT,CALF,MED, GEN2: Brand: MEDLINE

## (undated) DEVICE — 3M™ STERI-STRIP™ REINFORCED ADHESIVE SKIN CLOSURES, R1549, 1/2 IN X 2 IN (12 MM X 50 MM), 6 STRIPS/ENVELOPE: Brand: 3M™ STERI-STRIP™

## (undated) DEVICE — SUTURE VCRL SZ 4-0 L18IN ABSRB UD L19MM PS-2 3/8 CIR PRIM J496H

## (undated) DEVICE — SKIN AFFIX SURG ADHESIVE 72/CS 0.55ML: Brand: MEDLINE

## (undated) DEVICE — CHLORAPREP 26ML ORANGE

## (undated) DEVICE — PENCIL SMK EVAC L10FT TBNG NONSTICK ESU BLDE PLUMEPEN ELITE

## (undated) DEVICE — STAPLER SKIN SQ 30 ABSRB STPL DISP INSORB

## (undated) DEVICE — PAD DRESSING TELFA OUCHLESS NONADH 3X8IN

## (undated) DEVICE — 50" SINGLE PATIENT USE HOVERMATT: Brand: SINGLE PATIENT USE HOVERMATT

## (undated) DEVICE — Z CONVERTED USE 2275207 CLOTH PREP W7.5XL7.5IN 2% CHG SKIN ALC AND RNS FREE

## (undated) DEVICE — 3M™ STERI-STRIP™ COMPOUND BENZOIN TINCTURE 40 BAGS/CARTON 4 CARTONS/CASE C1544: Brand: 3M™ STERI-STRIP™

## (undated) DEVICE — WOUND RETRACTOR AND PROTECTOR: Brand: ALEXIS WOUND PROTECTOR-RETRACTOR

## (undated) DEVICE — GLOVE SURG SZ 65 THK91MIL LTX FREE SYN POLYISOPRENE

## (undated) DEVICE — BANDAGE COMPR W3INXL5YD COT ZN OXIDE TAN E ADH HVYWT

## (undated) DEVICE — SUTURE VCRL SZ 0 L36IN ABSRB UD L36MM CT-1 1/2 CIR J946H

## (undated) DEVICE — SUTURE COAT VCRL SZ 0 L36IN ABSRB VLT CTX L48MM TAPERPOINT J370H